# Patient Record
Sex: MALE | Race: WHITE | NOT HISPANIC OR LATINO | Employment: OTHER | URBAN - METROPOLITAN AREA
[De-identification: names, ages, dates, MRNs, and addresses within clinical notes are randomized per-mention and may not be internally consistent; named-entity substitution may affect disease eponyms.]

---

## 2017-02-15 ENCOUNTER — HOSPITAL ENCOUNTER (OUTPATIENT)
Dept: RADIOLOGY | Facility: HOSPITAL | Age: 72
Discharge: HOME | End: 2017-02-15
Attending: INTERNAL MEDICINE
Payer: COMMERCIAL

## 2017-02-15 ENCOUNTER — OFFICE VISIT (OUTPATIENT)
Dept: FAMILY MEDICINE | Facility: CLINIC | Age: 72
End: 2017-02-15
Payer: COMMERCIAL

## 2017-02-15 VITALS
BODY MASS INDEX: 30.34 KG/M2 | TEMPERATURE: 97 F | HEIGHT: 68 IN | HEART RATE: 72 BPM | SYSTOLIC BLOOD PRESSURE: 126 MMHG | OXYGEN SATURATION: 96 % | RESPIRATION RATE: 18 BRPM | WEIGHT: 200.19 LBS | DIASTOLIC BLOOD PRESSURE: 72 MMHG

## 2017-02-15 DIAGNOSIS — E78.00 HYPERCHOLESTEROLEMIA: ICD-10-CM

## 2017-02-15 DIAGNOSIS — Z85.038 PERSONAL HISTORY OF COLON CANCER: ICD-10-CM

## 2017-02-15 DIAGNOSIS — R73.02 IGT (IMPAIRED GLUCOSE TOLERANCE): ICD-10-CM

## 2017-02-15 DIAGNOSIS — J40 BRONCHITIS: ICD-10-CM

## 2017-02-15 DIAGNOSIS — I10 ESSENTIAL HYPERTENSION: ICD-10-CM

## 2017-02-15 DIAGNOSIS — I10 ESSENTIAL HYPERTENSION: Primary | ICD-10-CM

## 2017-02-15 PROCEDURE — 1157F ADVNC CARE PLAN IN RCRD: CPT | Mod: S$GLB,,, | Performed by: INTERNAL MEDICINE

## 2017-02-15 PROCEDURE — 99214 OFFICE O/P EST MOD 30 MIN: CPT | Mod: S$GLB,,, | Performed by: INTERNAL MEDICINE

## 2017-02-15 PROCEDURE — 1160F RVW MEDS BY RX/DR IN RCRD: CPT | Mod: S$GLB,,, | Performed by: INTERNAL MEDICINE

## 2017-02-15 PROCEDURE — 1159F MED LIST DOCD IN RCRD: CPT | Mod: S$GLB,,, | Performed by: INTERNAL MEDICINE

## 2017-02-15 PROCEDURE — 3078F DIAST BP <80 MM HG: CPT | Mod: S$GLB,,, | Performed by: INTERNAL MEDICINE

## 2017-02-15 PROCEDURE — 71020 XR CHEST PA AND LATERAL: CPT | Mod: TC,PO

## 2017-02-15 PROCEDURE — 99999 PR PBB SHADOW E&M-EST. PATIENT-LVL III: CPT | Mod: PBBFAC,,, | Performed by: INTERNAL MEDICINE

## 2017-02-15 PROCEDURE — 3074F SYST BP LT 130 MM HG: CPT | Mod: S$GLB,,, | Performed by: INTERNAL MEDICINE

## 2017-02-15 PROCEDURE — 1126F AMNT PAIN NOTED NONE PRSNT: CPT | Mod: S$GLB,,, | Performed by: INTERNAL MEDICINE

## 2017-02-15 PROCEDURE — 71020 XR CHEST PA AND LATERAL: CPT | Mod: 26,,, | Performed by: RADIOLOGY

## 2017-02-15 RX ORDER — DOXYCYCLINE 100 MG/1
100 CAPSULE ORAL 2 TIMES DAILY
Qty: 20 CAPSULE | Refills: 0 | Status: SHIPPED | OUTPATIENT
Start: 2017-02-15 | End: 2017-04-20

## 2017-02-15 RX ORDER — ALBUTEROL SULFATE 90 UG/1
2 AEROSOL, METERED RESPIRATORY (INHALATION) EVERY 6 HOURS PRN
Qty: 18 G | Refills: 0 | Status: SHIPPED | OUTPATIENT
Start: 2017-02-15 | End: 2017-04-20

## 2017-02-15 RX ORDER — PROMETHAZINE HYDROCHLORIDE AND DEXTROMETHORPHAN HYDROBROMIDE 6.25; 15 MG/5ML; MG/5ML
5 SYRUP ORAL 3 TIMES DAILY PRN
Qty: 118 ML | Refills: 0 | Status: SHIPPED | OUTPATIENT
Start: 2017-02-15 | End: 2017-02-25

## 2017-02-15 NOTE — PROGRESS NOTES
Subjective:       Patient ID: Marcelle Moore is a 71 y.o. male.    Chief Complaint: Follow-up; Cough; Hypertension; Hyperlipidemia; and igt    Cough   The current episode started 1 to 4 weeks ago. Pertinent negatives include no chest pain, chills, ear pain, eye redness, fever, headaches, myalgias, postnasal drip, rash, rhinorrhea, sore throat, shortness of breath or wheezing.   Hypertension   Pertinent negatives include no chest pain, headaches, neck pain, palpitations or shortness of breath. Past treatments include angiotensin blockers. The current treatment provides significant improvement.   Hyperlipidemia   Pertinent negatives include no chest pain, myalgias or shortness of breath. Current antihyperlipidemic treatment includes statins. The current treatment provides significant improvement of lipids.     Review of Systems   Constitutional: Negative for activity change, appetite change, chills, diaphoresis, fatigue, fever and unexpected weight change.   HENT: Negative for drooling, ear discharge, ear pain, facial swelling, hearing loss, mouth sores, nosebleeds, postnasal drip, rhinorrhea, sinus pressure, sneezing, sore throat, tinnitus, trouble swallowing and voice change.    Eyes: Negative for photophobia, redness and visual disturbance.   Respiratory: Positive for cough. Negative for apnea, choking, chest tightness, shortness of breath and wheezing.    Cardiovascular: Negative for chest pain, palpitations and leg swelling.   Gastrointestinal: Negative for abdominal distention, abdominal pain, anal bleeding, blood in stool, constipation, diarrhea, nausea, rectal pain and vomiting.   Endocrine: Negative for cold intolerance, heat intolerance, polydipsia, polyphagia and polyuria.   Genitourinary: Negative for difficulty urinating, dysuria, enuresis, flank pain, frequency, genital sores, hematuria and urgency.   Musculoskeletal: Negative for arthralgias, back pain, gait problem, joint swelling, myalgias, neck  pain and neck stiffness.   Skin: Negative for color change, pallor, rash and wound.   Allergic/Immunologic: Negative for food allergies and immunocompromised state.   Neurological: Negative for dizziness, tremors, seizures, syncope, facial asymmetry, speech difficulty, weakness, light-headedness, numbness and headaches.   Hematological: Negative for adenopathy. Does not bruise/bleed easily.   Psychiatric/Behavioral: Negative for agitation, behavioral problems, confusion, decreased concentration, dysphoric mood, hallucinations, self-injury, sleep disturbance and suicidal ideas. The patient is not nervous/anxious and is not hyperactive.        Objective:      Physical Exam   Constitutional: He is oriented to person, place, and time. He appears well-developed and well-nourished. No distress.   HENT:   Head: Normocephalic and atraumatic.   Mouth/Throat: No oropharyngeal exudate.   Eyes: Pupils are equal, round, and reactive to light. No scleral icterus.   Neck: Normal range of motion. Neck supple. No JVD present. Carotid bruit is not present. No tracheal deviation present. No thyromegaly present.   Cardiovascular: Normal rate, regular rhythm, normal heart sounds and intact distal pulses.    Pulmonary/Chest: Effort normal. No respiratory distress. He has wheezes. He has no rales. He exhibits no tenderness.   Abdominal: Soft. Bowel sounds are normal. He exhibits no distension. There is no tenderness. There is no rebound and no guarding.   Musculoskeletal: Normal range of motion. He exhibits no edema or tenderness.   Lymphadenopathy:     He has no cervical adenopathy.   Neurological: He is alert and oriented to person, place, and time.   Skin: Skin is warm and dry. No rash noted. He is not diaphoretic. No erythema. No pallor.   Psychiatric: He has a normal mood and affect. His behavior is normal. Judgment and thought content normal.   Nursing note and vitals reviewed.      Assessment:       1. Essential hypertension    2.  Hypercholesterolemia    3. IGT (impaired glucose tolerance)    4. Personal history of colon cancer    5. Bronchitis        Plan:         stable-----------continue meds.                     Notes/labs reviewed.                      Check cxr, doxy 100 mg bid x 10 days, promethazine cough med prn, albuterol inh prn--call if persists.                     F/u prn or 6 months.

## 2017-02-15 NOTE — MR AVS SNAPSHOT
[facility]  [address]  [place] [address]  Phone: [phone]                  Marcelle Moore   2/15/2017 1:00 PM   Office Visit    Description:  Male : 1945   Provider:  El Barrios MD   Department:  [facility]           Reason for Visit     Follow-up     Cough     Hypertension     Hyperlipidemia     igt           Diagnoses this Visit        Comments    Essential hypertension    -  Primary     Hypercholesterolemia         IGT (impaired glucose tolerance)         Personal history of colon cancer         Bronchitis                To Do List           Future Appointments        Provider Department Dept Phone    2/15/2017  2:00 PM [facility] XR2 [facility] [phone]    2017 10:00 AM LABORATORY, [facility] [phone]    2017 10:20 AM Aakash Bryson MD [facility] - Hemotology Oncology [phone]    8/15/2017 1:00 PM El Barrios MD [facility] [phone]      Goals (5 Years of Data)     None      Follow-Up and Disposition     Return in about 6 months (around 8/15/2017).       These Medications        Disp Refills Start End    doxycycline (MONODOX) 100 MG capsule 20 capsule 0 2/15/2017     Take 1 capsule (100 mg total) by mouth 2 (two) times daily. - Oral    Pharmacy: [facility] [place], [id number] Ph #: [phone]       promethazine-dextromethorphan (PROMETHAZINE-DM) 6.25-15 mg/5 mL Syrp 118 mL 0 2/15/2017 2017    Take 5 mLs by mouth 3 (three) times daily as needed. - Oral    Pharmacy: [facility]  [place], [place] [id number] Ph #: [phone]       albuterol 90 mcg/actuation inhaler 18 g 0 2/15/2017     Inhale 2 puffs into the lungs every 6 (six) hours as needed for Wheezing. Rescue - Inhalation    Pharmacy: [facility] [place], [place] [id number] Ph #:  [phone]         [facility]     [facility] Care [facility] - 24/7 Assistance  Registered nurses in the [facility] provide clinical advisement, health education, appointment booking, and other advisory services.  Call for this free service at [phone].             Medications           Message regarding Medications     Verify the changes and/or additions to your medication regime listed below are the same as discussed with your clinician today.  If any of these changes or additions are incorrect, please notify your healthcare provider.        START taking these NEW medications        Refills    doxycycline (MONODOX) 100 MG capsule 0    Sig: Take 1 capsule (100 mg total) by mouth 2 (two) times daily.    Class: Normal    Route: Oral    promethazine-dextromethorphan (PROMETHAZINE-DM) 6.25-15 mg/5 mL Syrp 0    Sig: Take 5 mLs by mouth 3 (three) times daily as needed.    Class: Normal    Route: Oral    albuterol 90 mcg/actuation inhaler 0    Sig: Inhale 2 puffs into the lungs every 6 (six) hours as needed for Wheezing. Rescue    Class: Normal    Route: Inhalation           Verify that the below list of medications is an accurate representation of the medications you are currently taking.  If none reported, the list may be blank. If incorrect, please contact your healthcare provider. Carry this list with you in case of emergency.           Current Medications     losartan (COZAAR) 100 MG tablet TAKE ONE TABLET BY MOUTH ONCE DAILY    simvastatin (ZOCOR) 20 MG tablet TAKE ONE TABLET BY MOUTH ONCE DAILY    albuterol 90 mcg/actuation inhaler Inhale 2 puffs into the lungs every 6 (six) hours as needed for Wheezing. Rescue    doxycycline (MONODOX) 100 MG capsule Take 1 capsule (100 mg total) by mouth 2 (two) times daily.    promethazine-dextromethorphan (PROMETHAZINE-DM) 6.25-15 mg/5 mL Syrp Take 5 mLs by mouth 3 (three) times daily as needed.           Clinical Reference Information          "  Your Vitals Were     BP Pulse Temp Resp    126/72 (BP Location: Right arm, Patient Position: Sitting, BP Method: Manual) 72 96.7 °F (35.9 °C) (Tympanic) 18    Height Weight SpO2 BMI    5' 7.5" (1.715 m) 90.8 kg (200 lb 2.8 oz) 96% 30.89 kg/m2      Blood Pressure          Most Recent Value    BP  126/72      Allergies as of 2/15/2017     No Known Allergies      Immunizations Administered on Date of Encounter - 2/15/2017     None      Orders Placed During Today's Visit     Future Labs/Procedures Expected by Expires    X-Ray Chest PA And Lateral  2/15/2017 2/15/2018      Language Assistance Services     ATTENTION: Language assistance services are available, free of charge. Please call [phone].      ATENCIÓN: Si alenla keith, tiene a townsend disposición servicios gratuitos de asistencia lingüística. Llame al [phone].     CHÚ Ý: N?u b?n nói Ti?ng Vi?t, có các d?ch v? h? tr? ngôn ng? mi?n phí dành cho b?n. G?i s? [phone].         [facility] complies with applicable Federal civil rights laws and does not discriminate on the basis of race, color, national origin, age, disability, or sex.        "

## 2017-04-20 ENCOUNTER — LAB VISIT (OUTPATIENT)
Dept: LAB | Facility: HOSPITAL | Age: 72
End: 2017-04-20
Attending: INTERNAL MEDICINE
Payer: COMMERCIAL

## 2017-04-20 ENCOUNTER — OFFICE VISIT (OUTPATIENT)
Dept: HEMATOLOGY/ONCOLOGY | Facility: CLINIC | Age: 72
End: 2017-04-20
Payer: COMMERCIAL

## 2017-04-20 VITALS
HEIGHT: 68 IN | BODY MASS INDEX: 29.54 KG/M2 | WEIGHT: 194.88 LBS | RESPIRATION RATE: 18 BRPM | HEART RATE: 66 BPM | TEMPERATURE: 98 F | OXYGEN SATURATION: 99 % | DIASTOLIC BLOOD PRESSURE: 80 MMHG | SYSTOLIC BLOOD PRESSURE: 120 MMHG

## 2017-04-20 DIAGNOSIS — D50.0 IRON DEFICIENCY ANEMIA DUE TO CHRONIC BLOOD LOSS: ICD-10-CM

## 2017-04-20 DIAGNOSIS — D50.9 MICROCYTIC ANEMIA: Primary | ICD-10-CM

## 2017-04-20 DIAGNOSIS — C18.9 MALIGNANT NEOPLASM OF COLON, UNSPECIFIED PART OF COLON: ICD-10-CM

## 2017-04-20 DIAGNOSIS — Z85.038 PERSONAL HISTORY OF COLON CANCER: ICD-10-CM

## 2017-04-20 LAB
ALBUMIN SERPL BCP-MCNC: 3.9 G/DL
ALP SERPL-CCNC: 68 U/L
ALT SERPL W/O P-5'-P-CCNC: 22 U/L
ANION GAP SERPL CALC-SCNC: 8 MMOL/L
AST SERPL-CCNC: 17 U/L
BASOPHILS # BLD AUTO: 0.04 K/UL
BASOPHILS NFR BLD: 0.4 %
BILIRUB SERPL-MCNC: 0.5 MG/DL
BUN SERPL-MCNC: 15 MG/DL
CALCIUM SERPL-MCNC: 8.8 MG/DL
CEA SERPL-MCNC: 4.3 NG/ML
CHLORIDE SERPL-SCNC: 110 MMOL/L
CO2 SERPL-SCNC: 22 MMOL/L
CREAT SERPL-MCNC: 1 MG/DL
DIFFERENTIAL METHOD: ABNORMAL
EOSINOPHIL # BLD AUTO: 0.4 K/UL
EOSINOPHIL NFR BLD: 3.5 %
ERYTHROCYTE [DISTWIDTH] IN BLOOD BY AUTOMATED COUNT: 18.5 %
EST. GFR  (AFRICAN AMERICAN): >60 ML/MIN/1.73 M^2
EST. GFR  (NON AFRICAN AMERICAN): >60 ML/MIN/1.73 M^2
GLUCOSE SERPL-MCNC: 120 MG/DL
HCT VFR BLD AUTO: 39.9 %
HGB BLD-MCNC: 12.1 G/DL
LYMPHOCYTES # BLD AUTO: 2.1 K/UL
LYMPHOCYTES NFR BLD: 20.8 %
MCH RBC QN AUTO: 23.8 PG
MCHC RBC AUTO-ENTMCNC: 30.3 %
MCV RBC AUTO: 79 FL
MONOCYTES # BLD AUTO: 0.8 K/UL
MONOCYTES NFR BLD: 7.8 %
NEUTROPHILS # BLD AUTO: 6.9 K/UL
NEUTROPHILS NFR BLD: 67.5 %
PLATELET # BLD AUTO: 260 K/UL
PMV BLD AUTO: 9.8 FL
POTASSIUM SERPL-SCNC: 4.2 MMOL/L
PROT SERPL-MCNC: 6.9 G/DL
RBC # BLD AUTO: 5.08 M/UL
SODIUM SERPL-SCNC: 140 MMOL/L
WBC # BLD AUTO: 10.23 K/UL

## 2017-04-20 PROCEDURE — 82378 CARCINOEMBRYONIC ANTIGEN: CPT

## 2017-04-20 PROCEDURE — 1157F ADVNC CARE PLAN IN RCRD: CPT | Mod: 8P,S$GLB,, | Performed by: INTERNAL MEDICINE

## 2017-04-20 PROCEDURE — 80053 COMPREHEN METABOLIC PANEL: CPT | Mod: PO

## 2017-04-20 PROCEDURE — 1160F RVW MEDS BY RX/DR IN RCRD: CPT | Mod: S$GLB,,, | Performed by: INTERNAL MEDICINE

## 2017-04-20 PROCEDURE — 1159F MED LIST DOCD IN RCRD: CPT | Mod: S$GLB,,, | Performed by: INTERNAL MEDICINE

## 2017-04-20 PROCEDURE — 3079F DIAST BP 80-89 MM HG: CPT | Mod: S$GLB,,, | Performed by: INTERNAL MEDICINE

## 2017-04-20 PROCEDURE — 36415 COLL VENOUS BLD VENIPUNCTURE: CPT | Mod: PO

## 2017-04-20 PROCEDURE — 99214 OFFICE O/P EST MOD 30 MIN: CPT | Mod: S$GLB,,, | Performed by: INTERNAL MEDICINE

## 2017-04-20 PROCEDURE — 1126F AMNT PAIN NOTED NONE PRSNT: CPT | Mod: S$GLB,,, | Performed by: INTERNAL MEDICINE

## 2017-04-20 PROCEDURE — 85025 COMPLETE CBC W/AUTO DIFF WBC: CPT | Mod: PO

## 2017-04-20 PROCEDURE — 3074F SYST BP LT 130 MM HG: CPT | Mod: S$GLB,,, | Performed by: INTERNAL MEDICINE

## 2017-04-20 PROCEDURE — 99999 PR PBB SHADOW E&M-EST. PATIENT-LVL III: CPT | Mod: PBBFAC,,, | Performed by: INTERNAL MEDICINE

## 2017-04-20 RX ORDER — IRON,CARBONYL/ASCORBIC ACID 100-250 MG
1 TABLET ORAL DAILY
Qty: 30 TABLET | Refills: 3 | Status: SHIPPED | OUTPATIENT
Start: 2017-04-20 | End: 2017-05-24

## 2017-04-20 NOTE — PROGRESS NOTES
Subjective:       Patient ID: Marcelle Moore is a 71 y.o. male.    Chief Complaint: No chief complaint on file.    HPI This is a 69 year-old  gentleman who because of rectal bleeding had a colonoscopy in 2/2012/. This led to the diagnosis of what initially was felt to be cancer of the sigmoid colon. At the time of surgery, he was found to have two lesions. One below the peritoneal reflection felt to be a rectal cancer, and the other one in the sigmoid area. The patient had 3 positive lymph nodes. The pathology report of 03/15/2012, was the [id number]. Dr. Lacy talked informed me that there was >one that was obviously below the peritoneal reflection qualifying as a rectal cancer. We discussed the fact that recommendations for stage III rectal cancer included chemo/surgery and XRT. He had his Mediport placed He started the the infusional 5Fu portion of the treatment along with the radiation therapy. After 3 weeks, he had to have a break on both the infusional 5fu and XRT due to severe proctitis. He restarted and finished his XRT but did not receive the infusional 5FU. Following the end of the radiation therapy he decided not to have any further chemotherapy He had his Medipot removed. In early Oct 2012 It was noticed He had an increase in his CEA which had gone up from 3.2 to 7.4. He was asked to have a CT/PEt. it was reported as showing an indeterminate uptake in the right upper lobe of the liver. He was asked to return in 2 months. The repeat CT/PEt dec 2012 showed a solitary lesion showing uptake in the right lobe of the liver.He was sent to Dr Hammad Hawthorne of the surgical Oncology department at [facility].Necessary arrangements were done for him to have surgery. He was asked to have a triple phase CT of the liver prior to the surgery The triphase Ct was negative for metastatic disease. We discussed the situation with the Radiology department and Dr Hawthorne. It was decided to do a MRI of the liver. This was  been done and it did not show any obvious evidence of metastatic disease.   He has been followed expectantly since then and CEA and imaging studies have failed to show evidence Last Ct PEt was 2015 He had a colonoscopy in nd was found to have a polyps The gastroenterologist suggested a repeat colonoscopy in a year.  He raised the issued of a probable attenuated FAP .eventually, genetic studies were done and they were negative for familial polyposis,.  He is a rapid polyp former. He comes with hsi wife. He says he is doing well.   He ahd a colonsocopy 2016 and asked to return in a year.    He comes today accompanied by his wife. He says he is doing well.     ALLERGIES: None.  MEDICATIONS: See Med card  PREVIOUS SURGERIES: Left-sided hemicolectomy. Left eye cataract surgery.  Right shoulder surgery.  SOCIAL HISTORY: He is  with four children. He lives in [place].  He smoked briefly from  to , averaging a pack a day, but has not  smoked since. He denies drinking. He used to work as an  for a  lock system for the [employer].  FAMILY HISTORY: Grandfather had colon cancer. Younger sister had breast  cancer. Sister had diabetes. Father also had diabetes. Mother  of a  stroke. She also had a heart attack.  PAST MEDICAL HISTORY:  1. Status post diagnosis of sigmoid and rectal cancers (see above), stage  III.  2. Hypertension.  3. Chronic stable back pain.  4. Hearing loss.  5. Anemia.(iron def.)  6/radiation induced proctitis  8-elevated CEA post-surgery  Review of Systems   Constitutional: Negative.  Negative for fatigue.   Eyes: Negative.    Cardiovascular: Negative.  Negative for chest pain.   Gastrointestinal: Negative for abdominal pain and nausea.   Genitourinary: Negative.  Negative for hematuria.   Musculoskeletal: Negative.    Skin: Negative.    Neurological: Negative.    Psychiatric/Behavioral: Negative.        Objective:      Physical Exam   Constitutional: He  is oriented to person, place, and time. He appears well-developed and well-nourished.   HENT:   Head: Normocephalic.   Mouth/Throat: No oropharyngeal exudate.   Eyes: Pupils are equal, round, and reactive to light.   Neck: No thyromegaly present.   Cardiovascular: Normal rate, regular rhythm and normal heart sounds.  Exam reveals no gallop.    No murmur heard.  Pulmonary/Chest: No respiratory distress. He has no wheezes. He has no rales.   Abdominal: Soft. Bowel sounds are normal. He exhibits no distension and no mass. There is no rebound and no guarding.   Musculoskeletal: Normal range of motion. He exhibits no edema.   Lymphadenopathy:     He has no cervical adenopathy.   Neurological: He is alert and oriented to person, place, and time.   Skin: Skin is warm and dry.   Psychiatric: He has a normal mood and affect. His behavior is normal.       Wt Readings from Last 3 Encounters:   04/20/17 88.4 kg (194 lb 14.2 oz)   02/15/17 90.8 kg (200 lb 2.8 oz)   10/07/16 95.7 kg (210 lb 15.7 oz)     Temp Readings from Last 3 Encounters:   04/20/17 97.7 °F (36.5 °C) (Oral)   02/15/17 96.7 °F (35.9 °C) (Tympanic)   10/07/16 97.7 °F (36.5 °C) (Oral)     BP Readings from Last 3 Encounters:   04/20/17 120/80   02/15/17 126/72   10/07/16 (!) 146/84     Pulse Readings from Last 3 Encounters:   04/20/17 66   02/15/17 72   10/07/16 60       Assessment:       1. Microcytic anemia        Plan:       Lab Results   Component Value Date    WBC 10.23 04/20/2017    HGB 12.1 (L) 04/20/2017    HCT 39.9 (L) 04/20/2017    MCV 79 (L) 04/20/2017     04/20/2017       Is noticed his HGB has dropped from 16 to 12 in one year and the MCV  has gone down from 92 to 70.  Patient and wife were informed this type of prsentation raises the possibility of iron deficiency anemia due to GI losses.  He will be asked to have a ferritin and tibc. Today.  CEA/CMP are pending.  Three hemoccult slides were given to the patient to bring to his earliest  convenience.     Will call patietn and/or wife with the results of the irons tudies and discuss what to do going forward    ADDENDUM    Lab Results   Component Value Date    IRON 27 (L) 04/20/2017    TIBC 481 (H) 04/20/2017    FERRITIN 7 (L) 04/20/2017       Indexes are those of iron deficiency. He needs to meet with Gi to discuss upper/lower endoscopies.  Will call a Rx for ICAR C one tablet a day. See me in 2 months with a cbc, ferriitin and tibc before the visit    ADDENDUM:  4/24/2017  Three hemoccult slides were brought to my office today and were negative for occult blood

## 2017-04-21 ENCOUNTER — TELEPHONE (OUTPATIENT)
Dept: HEMATOLOGY/ONCOLOGY | Facility: CLINIC | Age: 72
End: 2017-04-21

## 2017-04-21 NOTE — TELEPHONE ENCOUNTER
Spoke with Mrs. Moore.  Dr Bryson spoke with Mrs. Moore. Will try to get sooner available appointment with Dr Steward.

## 2017-04-21 NOTE — TELEPHONE ENCOUNTER
----- Message from Aakash Bryson MD sent at 4/20/2017  5:06 PM CDT -----  Please call the wife.  Tell her the iron studies are low, so he does have iron deficiency anemia which is most likley due to bleeding in the GI tract.  Tell her I will call a Rx for ICAR C for him to take one tablet a day. Needs to see me in 2 months with a cbc and a ferritin.  Please also let her know he needs to meet with GI again to discuss doing an upper/lower endoscopies. Please set up an appointment with [facility] GI doctor has seen him before.  DR KIM

## 2017-04-24 ENCOUNTER — TELEPHONE (OUTPATIENT)
Dept: GASTROENTEROLOGY | Facility: CLINIC | Age: 72
End: 2017-04-24

## 2017-04-24 NOTE — TELEPHONE ENCOUNTER
Can you get him in to see me on Wednesday? The schedule is blocked for a meeting, but this meeting was rescheduled for Monday May 1 (already blocked as well). Thanks.

## 2017-04-26 ENCOUNTER — TELEPHONE (OUTPATIENT)
Dept: GASTROENTEROLOGY | Facility: CLINIC | Age: 72
End: 2017-04-26

## 2017-04-28 ENCOUNTER — TELEPHONE (OUTPATIENT)
Dept: HEMATOLOGY/ONCOLOGY | Facility: CLINIC | Age: 72
End: 2017-04-28

## 2017-04-28 ENCOUNTER — TELEPHONE (OUTPATIENT)
Dept: GASTROENTEROLOGY | Facility: CLINIC | Age: 72
End: 2017-04-28

## 2017-04-28 NOTE — TELEPHONE ENCOUNTER
----- Message from Leidy Kaye MA sent at 4/24/2017 10:10 AM CDT -----  Patient has seen Dr Steward in the past patient has a history of colon cancer and rectal bleeding.  His iron deficiency anemia is worse.  He needs a sooner appointment to discuss upper/lower endoscopies.  Thanks Clarisa

## 2017-04-28 NOTE — TELEPHONE ENCOUNTER
----- Message from Julia Gaines sent at 4/28/2017 10:36 AM CDT -----  States requesting test results. Please adv/call [phone].//thanks. cw

## 2017-05-24 ENCOUNTER — OFFICE VISIT (OUTPATIENT)
Dept: GASTROENTEROLOGY | Facility: CLINIC | Age: 72
End: 2017-05-24
Payer: COMMERCIAL

## 2017-05-24 VITALS
SYSTOLIC BLOOD PRESSURE: 138 MMHG | BODY MASS INDEX: 30.27 KG/M2 | HEART RATE: 68 BPM | DIASTOLIC BLOOD PRESSURE: 80 MMHG | WEIGHT: 199.75 LBS | HEIGHT: 68 IN

## 2017-05-24 DIAGNOSIS — Z86.010 HISTORY OF COLON POLYPS: ICD-10-CM

## 2017-05-24 DIAGNOSIS — D50.0 IRON DEFICIENCY ANEMIA DUE TO CHRONIC BLOOD LOSS: Primary | ICD-10-CM

## 2017-05-24 PROCEDURE — 1159F MED LIST DOCD IN RCRD: CPT | Mod: S$GLB,,, | Performed by: INTERNAL MEDICINE

## 2017-05-24 PROCEDURE — 1160F RVW MEDS BY RX/DR IN RCRD: CPT | Mod: S$GLB,,, | Performed by: INTERNAL MEDICINE

## 2017-05-24 PROCEDURE — 3075F SYST BP GE 130 - 139MM HG: CPT | Mod: S$GLB,,, | Performed by: INTERNAL MEDICINE

## 2017-05-24 PROCEDURE — 99214 OFFICE O/P EST MOD 30 MIN: CPT | Mod: S$GLB,,, | Performed by: INTERNAL MEDICINE

## 2017-05-24 PROCEDURE — 3079F DIAST BP 80-89 MM HG: CPT | Mod: S$GLB,,, | Performed by: INTERNAL MEDICINE

## 2017-05-24 PROCEDURE — 1157F ADVNC CARE PLAN IN RCRD: CPT | Mod: 8P,S$GLB,, | Performed by: INTERNAL MEDICINE

## 2017-05-24 PROCEDURE — 99999 PR PBB SHADOW E&M-EST. PATIENT-LVL III: CPT | Mod: PBBFAC,,, | Performed by: INTERNAL MEDICINE

## 2017-05-24 PROCEDURE — 1126F AMNT PAIN NOTED NONE PRSNT: CPT | Mod: S$GLB,,, | Performed by: INTERNAL MEDICINE

## 2017-05-24 RX ORDER — SODIUM, POTASSIUM,MAG SULFATES 17.5-3.13G
1 SOLUTION, RECONSTITUTED, ORAL ORAL ONCE
Qty: 1 BOTTLE | Refills: 0 | Status: SHIPPED | OUTPATIENT
Start: 2017-05-24 | End: 2017-05-24

## 2017-05-24 NOTE — PROGRESS NOTES
Clinic Follow Up:  [facility] Follow Up Note    Reason for Follow Up:  The primary encounter diagnosis was Iron deficiency anemia due to chronic blood loss. A diagnosis of History of colon polyps was also pertinent to this visit.    PCP: El Barrios       HPI:  This is a 71 y.o. male here for follow up of the above issues.  He is due for an upcoming colonoscopy.  He has fairly frequent colonoscopies because of a history of colon cancer and multiple colon polyps.  He was recently noted to be iron deficient.  He denies any blood in the stools or blood in the urine.  He denies any diarrhea, constipation, abdominal pain, weight loss, nausea, vomiting, dysphagia, heartburn, any other significant symptoms.  He denies regular NSAID use.  He uses naproxen about once or twice a week.  He hasn't been taking iron supplements recently.  His CEA has been stable.    Review of Systems:  CONSTITUTIONAL: Denies weight change,  fatigue, fevers, chills, night sweats.  CARDIOVASCULAR: Denies chest pain, shortness of breath, orthopnea and edema.  RESPIRATORY: Denies cough, hemoptysis, dyspnea, and wheezing.  GI: See HPI.  : Denies dysuria and hematuria.    Medical History:  Past Medical History:   Diagnosis Date    Blood transfusion     Cataract     Colon cancer     Colon polyps     Hyperlipidemia     Hypertension        Surgical History:   Past Surgical History:   Procedure Laterality Date    CATARACT EXTRACTION      COLECTOMY      COLONOSCOPY N/A 6/23/2016    Procedure: Colonoscopy;  Surgeon: Heath Steward MD;  [facility];  Service: Endoscopy;  Laterality: N/A;    SHOULDER SURGERY         Family History:   Family History   Problem Relation Age of Onset    Stroke Mother     Hypertension Mother     Diabetes Father     Breast cancer Sister     Colon cancer Paternal Grandfather        Social History:   Social History   Substance Use Topics    Smoking status: Former Smoker     " Quit date: 7/28/1967    Smokeless tobacco: Never Used    Alcohol use No       Allergies: Reviewed    Home Medications:  Medication List with Changes/Refills   New Medications    SODIUM,POTASSIUM,MAG SULFATES (SUPREP BOWEL PREP KIT) 17.5-3.13-1.6 GRAM SOLR    Take 1 kit by mouth once.   Current Medications    LOSARTAN (COZAAR) 100 MG TABLET    TAKE ONE TABLET BY MOUTH ONCE DAILY    SIMVASTATIN (ZOCOR) 20 MG TABLET    TAKE ONE TABLET BY MOUTH ONCE DAILY   Discontinued Medications    IRON-VITAMIN C 100-250 MG, ICAR-C, (ICAR-C) 100-250 MG TAB    Take 1 tablet by mouth once daily.       Physical Exam:  Vital Signs:  /80   Pulse 68   Ht 5' 8" (1.727 m)   Wt 90.6 kg (199 lb 11.8 oz)   BMI 30.37 kg/m²   Body mass index is 30.37 kg/m².      GENERAL: No acute distress, Alert and oriented x 4  EYES: Anicteric, no pallor noted.  ENT: Oropharynx is clear  NECK: Supple, no masses, no thyromegally.  CHEST: Equal breath sounds bilaterally, no wheezing.  CARDIOVASCULAR: Regular rate and rhythm. Murmurs, rubs and gallops absent.  ABDOMEN: soft, non-tender, non-distended, normal bowel sounds.  EXTREMITIES: No clubbing, cyanosis or edema.  SKIN: Without lesion.  LYMPH: No cervical, axillary lymphadenopathy palpable.   NEUROLOGICAL: Grossly normal.    Labs: Pertinent labs reviewed.  Labs are consistent with a mild iron deficiency anemia.    Endoscopy:  No report available    CRC Screening: Due for colonoscopy    Assessment:  1. Iron deficiency anemia due to chronic blood loss    2. History of colon polyps        Recommendations:  1.  Iron deficiency anemia: We will plan for an upper endoscopy and colonoscopy.  If these are unrevealing we will proceed with capsule endoscopy.  No clear source of iron deficiency based on history.    Return to Clinic:    No Follow-up on file.        "

## 2017-06-15 ENCOUNTER — SURGERY (OUTPATIENT)
Age: 72
End: 2017-06-15

## 2017-06-15 ENCOUNTER — HOSPITAL ENCOUNTER (OUTPATIENT)
Facility: HOSPITAL | Age: 72
Discharge: HOME | End: 2017-06-15
Attending: INTERNAL MEDICINE | Admitting: INTERNAL MEDICINE
Payer: COMMERCIAL

## 2017-06-15 ENCOUNTER — ANESTHESIA (OUTPATIENT)
Dept: ENDOSCOPY | Facility: HOSPITAL | Age: 72
End: 2017-06-15
Payer: COMMERCIAL

## 2017-06-15 ENCOUNTER — ANESTHESIA EVENT (OUTPATIENT)
Dept: ENDOSCOPY | Facility: HOSPITAL | Age: 72
End: 2017-06-15
Payer: COMMERCIAL

## 2017-06-15 VITALS
SYSTOLIC BLOOD PRESSURE: 119 MMHG | WEIGHT: 192 LBS | OXYGEN SATURATION: 94 % | RESPIRATION RATE: 18 BRPM | TEMPERATURE: 98 F | DIASTOLIC BLOOD PRESSURE: 71 MMHG | HEIGHT: 68 IN | RESPIRATION RATE: 8 BRPM | BODY MASS INDEX: 29.1 KG/M2 | HEART RATE: 74 BPM

## 2017-06-15 DIAGNOSIS — D50.9 IRON DEFICIENCY ANEMIA: ICD-10-CM

## 2017-06-15 PROCEDURE — 43239 EGD BIOPSY SINGLE/MULTIPLE: CPT | Performed by: INTERNAL MEDICINE

## 2017-06-15 PROCEDURE — 88305 TISSUE EXAM BY PATHOLOGIST: CPT | Performed by: PATHOLOGY

## 2017-06-15 PROCEDURE — 25000003 PHARM REV CODE 250: Performed by: NURSE ANESTHETIST, CERTIFIED REGISTERED

## 2017-06-15 PROCEDURE — 37000008 HC ANESTHESIA 1ST 15 MINUTES: Performed by: INTERNAL MEDICINE

## 2017-06-15 PROCEDURE — 88305 TISSUE EXAM BY PATHOLOGIST: CPT | Mod: 26,,, | Performed by: PATHOLOGY

## 2017-06-15 PROCEDURE — 45380 COLONOSCOPY AND BIOPSY: CPT | Performed by: INTERNAL MEDICINE

## 2017-06-15 PROCEDURE — 63600175 PHARM REV CODE 636 W HCPCS: Performed by: NURSE ANESTHETIST, CERTIFIED REGISTERED

## 2017-06-15 PROCEDURE — 45380 COLONOSCOPY AND BIOPSY: CPT | Mod: 33,,, | Performed by: INTERNAL MEDICINE

## 2017-06-15 PROCEDURE — 27201012 HC FORCEPS, HOT/COLD, DISP: Performed by: INTERNAL MEDICINE

## 2017-06-15 PROCEDURE — 25000003 PHARM REV CODE 250: Performed by: INTERNAL MEDICINE

## 2017-06-15 PROCEDURE — 37000009 HC ANESTHESIA EA ADD 15 MINS: Performed by: INTERNAL MEDICINE

## 2017-06-15 PROCEDURE — 43239 EGD BIOPSY SINGLE/MULTIPLE: CPT | Mod: 51,,, | Performed by: INTERNAL MEDICINE

## 2017-06-15 RX ORDER — PROPOFOL 10 MG/ML
INJECTION, EMULSION INTRAVENOUS
Status: DISCONTINUED | OUTPATIENT
Start: 2017-06-15 | End: 2017-06-15

## 2017-06-15 RX ORDER — LIDOCAINE HCL/PF 100 MG/5ML
SYRINGE (ML) INTRAVENOUS
Status: DISCONTINUED | OUTPATIENT
Start: 2017-06-15 | End: 2017-06-15

## 2017-06-15 RX ORDER — OMEPRAZOLE 40 MG/1
40 CAPSULE, DELAYED RELEASE ORAL DAILY
Qty: 90 CAPSULE | Refills: 3 | Status: SHIPPED | OUTPATIENT
Start: 2017-06-15 | End: 2017-12-27

## 2017-06-15 RX ORDER — SODIUM CHLORIDE, SODIUM LACTATE, POTASSIUM CHLORIDE, CALCIUM CHLORIDE 600; 310; 30; 20 MG/100ML; MG/100ML; MG/100ML; MG/100ML
INJECTION, SOLUTION INTRAVENOUS CONTINUOUS PRN
Status: DISCONTINUED | OUTPATIENT
Start: 2017-06-15 | End: 2017-06-15

## 2017-06-15 RX ORDER — SODIUM CHLORIDE, SODIUM LACTATE, POTASSIUM CHLORIDE, CALCIUM CHLORIDE 600; 310; 30; 20 MG/100ML; MG/100ML; MG/100ML; MG/100ML
INJECTION, SOLUTION INTRAVENOUS CONTINUOUS
Status: DISCONTINUED | OUTPATIENT
Start: 2017-06-15 | End: 2017-06-15 | Stop reason: HOSPADM

## 2017-06-15 RX ADMIN — SODIUM CHLORIDE, SODIUM LACTATE, POTASSIUM CHLORIDE, AND CALCIUM CHLORIDE: .6; .31; .03; .02 INJECTION, SOLUTION INTRAVENOUS at 08:06

## 2017-06-15 RX ADMIN — PROPOFOL 30 MG: 10 INJECTION, EMULSION INTRAVENOUS at 09:06

## 2017-06-15 RX ADMIN — LIDOCAINE HYDROCHLORIDE 50 MG: 20 INJECTION, SOLUTION INTRAVENOUS at 08:06

## 2017-06-15 RX ADMIN — PROPOFOL 140 MG: 10 INJECTION, EMULSION INTRAVENOUS at 08:06

## 2017-06-15 RX ADMIN — SODIUM CHLORIDE, SODIUM LACTATE, POTASSIUM CHLORIDE, AND CALCIUM CHLORIDE: 600; 310; 30; 20 INJECTION, SOLUTION INTRAVENOUS at 08:06

## 2017-06-15 RX ADMIN — PROPOFOL 30 MG: 10 INJECTION, EMULSION INTRAVENOUS at 08:06

## 2017-06-15 NOTE — INTERVAL H&P NOTE
The patient has been examined and the H&P has been reviewed:    I concur with the findings and no changes have occurred since H&P was written.    Anesthesia/Surgery risks, benefits and alternative options discussed and understood by patient/family.          Active Hospital Problems    Diagnosis  POA    Iron deficiency anemia [D50.9]  Yes      Resolved Hospital Problems    Diagnosis Date Resolved POA   No resolved problems to display.

## 2017-06-15 NOTE — DISCHARGE INSTRUCTIONS
Diverticulosis    Diverticulosis means that small pouches have formed in the wall of your large intestine (colon). Most often, this problem causes no symptoms and is common as people age. But the pouches in the colon are at risk of becoming infected. When this happens, the condition is called diverticulitis. Although most people with diverticulosis never develop diverticulitis, it is still not uncommon. Rectal bleeding can also occur and in less common situations, a type of colon inflammation called colitis.  While most people do not have symptoms, some people with diverticulosis may have:  · Abdominal cramps and pain  · Bloating  · Constipation  · Change in bowel habits  Causes  The exact cause of diverticulosis (and diverticulitis) has not been proved, but a few things are associated with the condition:  · Low-fiber diet  · Constipation  · Lack of exercise  Your healthcare provider will talk with you about how to manage your condition. Diet changes may be all that are needed to help control diverticulosis and prevent progression to diverticulitis. If you develop diverticulitis, you will likely need other treatments.  Home care  You may be told to take fiber supplements daily. Fiber adds bulk to the stool so that it passes through the colon more easily. Stool softeners may be recommended. You may also be given medications for pain relief. Be sure to take all medications as directed.  In the past, people were told to avoid corn, nuts, and seeds. This is no longer necessary.  Follow these guidelines when caring for yourself at home:  · Eat unprocessed foods that are high in fiber. Whole grains, fruits, and vegetables are good choices.  · Drink 6 to 8 glasses of water every day unless your healthcare provider has you limit how much fluid you should have.  · Watch for changes in your bowel movements. Tell your provider if you notice any changes.  · Begin an exercise program. Ask your provider how to get started.  Generally, walking is the best.  · Get plenty of rest and sleep.  Follow-up care  Follow up with your healthcare provider, or as advised. Regular visits may be needed to check on your health. Sometimes special procedures such as colonoscopy, are needed after an episode of diverticulitis or blooding. Be sure to keep all your appointments.  If a stool sample was taken, or cultures were done, you should be told if they are positive, or if your treatment needs to be changed. You can call as directed for the results.  If X-rays were done, a radiologist will look at them. You will be told if there is a change in your treatment.  If antibiotics were prescribed, be sure to finish them all.  When to seek medical advice  Call your healthcare provider right away if any of these occur:  · Fever of 100.4°F (38°C) or higher, or as directed by your healthcare provider  · Severe cramps in the lower left side of the abdomen or pain that is getting worse  · Tenderness in the lower left side of the abdomen or worsening pain throughout the abdomen  · Diarrhea or constipation that doesn't get better within 24 hours  · Nausea and vomiting  · Bleeding from the rectum  Call 911  Call emergency services if any of the following occur:  · Trouble breathing  · Confusion  · Very drowsy or trouble awakening  · Fainting or loss of consciousness  · Rapid heart rate  · Chest pain  Date Last Reviewed: 12/30/2015 © 2000-2016 [employer]. [address] [place] [id number]. All rights reserved. This information is not intended as a substitute for professional medical care. Always follow your healthcare professional's instructions.        Understanding Colon and Rectal Polyps    The colon (also called the large intestine) is a muscular tube that forms the last part of the digestive tract. It absorbs water and stores food waste. The colon is about 4 to 6 feet long. The rectum is the last 6 inches of the colon. The colon and rectum  have a smooth lining composed of millions of cells. Changes in these cells can lead to growths in the colon that can become cancerous and should be removed. Multiple tests are available to screen for colon cancer, but the colonoscopy is the most recommended test. During colonoscopy, these polyps can be removed. How often you need this test depends on many things including your condition, your family history, symptoms, and what the findings were at the previous colonoscopy.   When the colon lining changes  Changes that happen in the cells that line the colon or rectum can lead to growths called polyps. Over a period of years, polyps can turn cancerous. Removing polyps early may prevent cancer from ever forming.  Polyps  Polyps are fleshy clumps of tissue that form on the lining of the colon or rectum. Small polyps are usually benign (not cancerous). However, over time, cells in a polyp can change and become cancerous. Certain types of polyps known as adenomatous polyps are premalignant. The risk for invasive cancer increases with the size of the polyp and certain cell and gene features. This means that they can become cancerous if they're not removed. Hyperplastic polyps are benign. They can grow quite large and not turn cancerous.   Cancer  Almost all colorectal cancers start when polyp cells begin growing abnormally. As a cancerous tumor grows, it may involve more and more of the colon or rectum. In time, cancer can also grow beyond the colon or rectum and spread to nearby organs or to glands called lymph nodes. The cells can also travel to other parts of the body. This is known as metastasis. The earlier a cancerous tumor is removed, the better the chance of preventing its spread.    Date Last Reviewed: 8/1/2016  © 5786-9757 [employer]. [address] [place] [address], [place] [address]. All rights reserved. This information is not intended as a substitute for professional medical care. Always follow your  healthcare professional's instructions.        Esophagitis     With esophagitis, the lining of the esophagus is inflamed.   Do you often have burning pain in your chest? You may have esophagitis. This is when the lining of the esophagus becomes red and swollen (inflamed). The esophagus is the tube that connects your throat to your stomach. This sheet tells you more about esophagitis. It also explains your treatment options.  Main types of esophagitis  Reflux esophagitis. This is the more common type. It is caused by GERD (gastroesophageal reflux disease). Stomach contents with stomach acid flow back up into the esophagus. This happens over and over. It leads to inflammation. Risk factors can include:  · Being overweight  · Asthma  · Smoking  · Pregnancy  · Frequent vomiting  · Certain medicines (such as aspirin and other anti-inflammatories)  · Hiatal hernia  Infectious esophagitis. This is caused by an infection. You are more at risk for this if you have a weakened immune system and poor nutrition. Antibiotic use can also be a factor. The infection is often due to the following:  · A type of fungus (typically candida)  · A virus, such as herpes simplex virus 1 (HSV-1) or cytomegalovirus (CMV)  Eosinophilic esophagitis. Foods or other things around you can give you an allergic reaction. This triggers an immune response and leads to esophagitis.  Pill-induced esophagitis. Certain types of medicines can cause inflammation and ulcers in the esophagus. These include doxycycline, aspirin, NSAIDs, alendronate, potassium, quinidine, iron.  Symptoms of esophagitis  The following symptoms can occur with esophagitis:  · Pain when swallowing, or trouble swallowing  · Pain behind your breastbone (heartburn)  · Acid regurgitation  · Chronic sore throat  · Gum Inflammation  · Cavities  · Bad breath  · Nausea  · Pain in your upper belly (abdomen)  · Bleeding (indicated by bright red vomit or black, tarry stool)  These symptoms  occur more often with reflux esophagitis:  · Coughing, wheezing, or asthma  · Hoarseness  Diagnosis of esophagitis  Your healthcare provider will ask about your health history and symptoms. Youll also be examined. Sometimes certain tests are needed. These may include:  · Upper endoscopy. A thin, flexible tube with a tiny light and camera is used. It is inserted through the mouth down into the esophagus. This lets the provider look for damage. A small sample of tissue (biopsy) may also be removed. The sample is sent to a lab for testing.  · Upper GI X-ray with barium. An X-ray is done after you drink a substance called barium. Barium may make problems in the esophagus easier to see on an x-ray.  · Esophageal pH. A soft, thin tube is passed into the esophagus through the nose or mouth for 24 hours. It measures the acid level in the esophagus.  · Esophageal manometry. A soft, thin tube is passed into the esophagus through the nose or mouth. It measures muscle contractions in the esophagus.  Treatment of esophagitis  Medicines. Different medicines can help treat esophagitis. The medicine used will depend on the type of esophagitis you have. Talk with your healthcare provider.  Lifestyle changes. Making the following changes can help reduce irritation and ease your symptoms:  · Avoid spicy foods (pepper, chili powder, mcginnis). Also avoid hard foods (nuts, crackers, raw vegetables) and acidic foods and drinks (tomatoes, citrus fruits and juices). Other problem foods include chocolate, peppermint, nutmeg, and foods high in fat.  · Until you can swallow without pain, follow a combined liquid and soft diet. Try foods such as cooked cereals, mashed potatoes, and soups.  · Take small bites and chew your food thoroughly.  · Avoid large meals and heavy evening meals. Don't lie down within 2 to 3 hours of eating.  · Get to or stay at a healthy weight.  · Avoid alcohol, caffeine, and smoking or tobacco products.  · Brush and floss  your teeth  · Raise your upper body by 4 to 6 inches when lying in bed. This can be done using a foam wedge. Or put blocks under the legs at the head of your bed.  Surgery. This may be needed for severe reflux esophagitis. Other noninvasive procedures to treat GERD and esophagitis are being studied. Your provider can tell you more.  Why treatment Is important  Without treatment, esophagitis can get worse. This is especially true with severe reflux esophagitis. For instance, continued symptoms can cause scarring of the esophagus. Over time, this can cause a narrowing the esophagus (stricture). This can make it hard to pass food down to the stomach. As symptoms go on they can also cause changes in the lining of the esophagus. These changes can put you at a slightly higher risk of cancer of the esophagus.   Date Last Reviewed: 7/1/2016  © 6744-4811 [employer]. [address] [place] [address]. All rights reserved. This information is not intended as a substitute for professional medical care. Always follow your healthcare professional's instructions.

## 2017-06-15 NOTE — ANESTHESIA POSTPROCEDURE EVALUATION
"Anesthesia Post Evaluation    Patient: Marcelle Moore    Procedure(s) Performed: Procedure(s) (LRB):  ESOPHAGOGASTRODUODENOSCOPY (EGD) (N/A)  COLONOSCOPY (N/A)    Final Anesthesia Type: MAC  Patient location during evaluation: PACU  Patient participation: Yes- Able to Participate  Level of consciousness: awake and alert and oriented  Post-procedure vital signs: reviewed and stable  Pain management: adequate  Airway patency: patent  PONV status at discharge: No PONV  Anesthetic complications: no      Cardiovascular status: blood pressure returned to baseline  Respiratory status: unassisted, room air and spontaneous ventilation  Hydration status: euvolemic  Follow-up not needed.        Visit Vitals  BP (!) 102/56 (BP Location: Left arm, Patient Position: Lying, BP Method: Automatic)   Pulse 75   Temp 36.8 °C (98.2 °F) (Oral)   Resp 16   Ht 5' 8" (1.727 m)   Wt 87.1 kg (192 lb)   SpO2 (!) 94%   BMI 29.19 kg/m²       Pain/Jaret Score: Pain Assessment Performed: Yes (6/15/2017  9:22 AM)  Presence of Pain: denies (6/15/2017  9:22 AM)  Jaret Score: 9 (6/15/2017  9:22 AM)      "

## 2017-06-15 NOTE — H&P (VIEW-ONLY)
Clinic Follow Up:  [facility] Follow Up Note    Reason for Follow Up:  The primary encounter diagnosis was Iron deficiency anemia due to chronic blood loss. A diagnosis of History of colon polyps was also pertinent to this visit.    PCP: El Barrios       HPI:  This is a 71 y.o. male here for follow up of the above issues.  He is due for an upcoming colonoscopy.  He has fairly frequent colonoscopies because of a history of colon cancer and multiple colon polyps.  He was recently noted to be iron deficient.  He denies any blood in the stools or blood in the urine.  He denies any diarrhea, constipation, abdominal pain, weight loss, nausea, vomiting, dysphagia, heartburn, any other significant symptoms.  He denies regular NSAID use.  He uses naproxen about once or twice a week.  He hasn't been taking iron supplements recently.  His CEA has been stable.    Review of Systems:  CONSTITUTIONAL: Denies weight change,  fatigue, fevers, chills, night sweats.  CARDIOVASCULAR: Denies chest pain, shortness of breath, orthopnea and edema.  RESPIRATORY: Denies cough, hemoptysis, dyspnea, and wheezing.  GI: See HPI.  : Denies dysuria and hematuria.    Medical History:  Past Medical History:   Diagnosis Date    Blood transfusion     Cataract     Colon cancer     Colon polyps     Hyperlipidemia     Hypertension        Surgical History:   Past Surgical History:   Procedure Laterality Date    CATARACT EXTRACTION      COLECTOMY      COLONOSCOPY N/A 6/23/2016    Procedure: Colonoscopy;  Surgeon: Heath Steward MD;  Location: [facility];  Service: Endoscopy;  Laterality: N/A;    SHOULDER SURGERY         Family History:   Family History   Problem Relation Age of Onset    Stroke Mother     Hypertension Mother     Diabetes Father     Breast cancer Sister     Colon cancer Paternal Grandfather        Social History:   Social History   Substance Use Topics    Smoking status: Former Smoker     " Quit date: 7/28/1967    Smokeless tobacco: Never Used    Alcohol use No       Allergies: Reviewed    Home Medications:  Medication List with Changes/Refills   New Medications    SODIUM,POTASSIUM,MAG SULFATES (SUPREP BOWEL PREP KIT) 17.5-3.13-1.6 GRAM SOLR    Take 1 kit by mouth once.   Current Medications    LOSARTAN (COZAAR) 100 MG TABLET    TAKE ONE TABLET BY MOUTH ONCE DAILY    SIMVASTATIN (ZOCOR) 20 MG TABLET    TAKE ONE TABLET BY MOUTH ONCE DAILY   Discontinued Medications    IRON-VITAMIN C 100-250 MG, ICAR-C, (ICAR-C) 100-250 MG TAB    Take 1 tablet by mouth once daily.       Physical Exam:  Vital Signs:  /80   Pulse 68   Ht 5' 8" (1.727 m)   Wt 90.6 kg (199 lb 11.8 oz)   BMI 30.37 kg/m²   Body mass index is 30.37 kg/m².      GENERAL: No acute distress, Alert and oriented x 4  EYES: Anicteric, no pallor noted.  ENT: Oropharynx is clear  NECK: Supple, no masses, no thyromegally.  CHEST: Equal breath sounds bilaterally, no wheezing.  CARDIOVASCULAR: Regular rate and rhythm. Murmurs, rubs and gallops absent.  ABDOMEN: soft, non-tender, non-distended, normal bowel sounds.  EXTREMITIES: No clubbing, cyanosis or edema.  SKIN: Without lesion.  LYMPH: No cervical, axillary lymphadenopathy palpable.   NEUROLOGICAL: Grossly normal.    Labs: Pertinent labs reviewed.  Labs are consistent with a mild iron deficiency anemia.    Endoscopy:  No report available    CRC Screening: Due for colonoscopy    Assessment:  1. Iron deficiency anemia due to chronic blood loss    2. History of colon polyps        Recommendations:  1.  Iron deficiency anemia: We will plan for an upper endoscopy and colonoscopy.  If these are unrevealing we will proceed with capsule endoscopy.  No clear source of iron deficiency based on history.    Return to Clinic:    No Follow-up on file.        "

## 2017-06-15 NOTE — PLAN OF CARE
Dr Sandoval came to bedside and discussed findings. NO N/V,  no abdominal pain, no GI bleeding, and vitals stable.  Pt discharged from unit.

## 2017-06-15 NOTE — ANESTHESIA PREPROCEDURE EVALUATION
06/15/2017  Marcelle Moore is a 71 y.o., male.    Pre-op Assessment    I have reviewed the Patient Summary Reports.     I have reviewed the Nursing Notes.   I have reviewed the Medications.     Review of Systems  Anesthesia Hx:  No problems with previous Anesthesia Last colonoscopy wake up was longer than usual Denies Family Hx of Anesthesia complications.   Denies Personal Hx of Anesthesia complications.   Social:  Non-Smoker, No Alcohol Use    Hematology/Oncology:         -- Anemia: --  Cancer in past history:  chemotherapy  Oncology Comments: Colon resection 2009 with chemo and radiation.     Cardiovascular:   Exercise tolerance: good Hypertension, well controlled hyperlipidemia    Pulmonary:   Snore   Renal/:  Renal/ Normal     Hepatic/GI:  Hepatic/GI Normal Bowel Prep. Last bowel prep 0500   Musculoskeletal:  Musculoskeletal Normal    Neurological:  Neurology Normal    Endocrine:  Endocrine Normal    Dermatological:  Skin Normal    Psych:  Psychiatric Normal           Physical Exam  General:  Well nourished, Obesity    Airway/Jaw/Neck:  Airway Findings: Mallampati: III     Dental:  Dental Findings: Upper Dentures, Lower Dentures             Anesthesia Plan  Type of Anesthesia, risks & benefits discussed:  Anesthesia Type:  MAC  Patient's Preference:   Intra-op Monitoring Plan:   Intra-op Monitoring Plan Comments:   Post Op Pain Control Plan:   Post Op Pain Control Plan Comments:   Induction:   IV  Beta Blocker:  Patient is on a Beta-Blocker and has received one dose within the past 24 hours (No further documentation required).       Informed Consent: Patient understands risks and agrees with Anesthesia plan.  Questions answered.   ASA Score: 2     Day of Surgery Review of History & Physical: I have interviewed and examined the patient. I have reviewed the patient's H&P dated: 06/23/16. There are  no significant changes.  H&P update referred to the provider.         Ready For Surgery From Anesthesia Perspective.

## 2017-06-15 NOTE — TRANSFER OF CARE
"Anesthesia Transfer of Care Note    Patient: Marcelle Moore    Procedure(s) Performed: Procedure(s) (LRB):  ESOPHAGOGASTRODUODENOSCOPY (EGD) (N/A)  COLONOSCOPY (N/A)    Patient location: PACU    Anesthesia Type: MAC    Transport from OR: Transported from OR on room air with adequate spontaneous ventilation    Post pain: adequate analgesia    Post assessment: no apparent anesthetic complications    Post vital signs: stable    Level of consciousness: sedated    Nausea/Vomiting: no nausea/vomiting    Complications: none    Transfer of care protocol was followed      Last vitals:   Visit Vitals  BP (!) 102/56 (BP Location: Left arm, Patient Position: Lying, BP Method: Automatic)   Pulse 75   Temp 36.8 °C (98.2 °F) (Oral)   Resp 16   Ht 5' 8" (1.727 m)   Wt 87.1 kg (192 lb)   SpO2 (!) 94%   BMI 29.19 kg/m²     "

## 2017-06-15 NOTE — ANESTHESIA RELEASE NOTE
"Anesthesia Release from PACU Note    Patient: Marcelle Moore    Procedure(s) Performed: Procedure(s) (LRB):  ESOPHAGOGASTRODUODENOSCOPY (EGD) (N/A)  COLONOSCOPY (N/A)    Anesthesia type: MAC    Post pain: Adequate analgesia    Post assessment: no apparent anesthetic complications, tolerated procedure well and no evidence of recall    Last Vitals:   Visit Vitals  BP (!) 102/56 (BP Location: Left arm, Patient Position: Lying, BP Method: Automatic)   Pulse 75   Temp 36.8 °C (98.2 °F) (Oral)   Resp 16   Ht 5' 8" (1.727 m)   Wt 87.1 kg (192 lb)   SpO2 (!) 94%   BMI 29.19 kg/m²       Post vital signs: stable    Level of consciousness: awake and alert     Nausea/Vomiting: no nausea/no vomiting    Complications: none    Airway Patency: patent    Respiratory: unassisted, spontaneous ventilation, room air    Cardiovascular: stable    Hydration: euvolemic  "

## 2017-10-27 RX ORDER — SIMVASTATIN 20 MG/1
20 TABLET, FILM COATED ORAL DAILY
Qty: 30 TABLET | Refills: 12 | Status: SHIPPED | OUTPATIENT
Start: 2017-10-27 | End: 2018-11-08 | Stop reason: SDUPTHER

## 2017-10-27 RX ORDER — LOSARTAN POTASSIUM 100 MG/1
100 TABLET ORAL DAILY
Qty: 30 TABLET | Refills: 12 | Status: SHIPPED | OUTPATIENT
Start: 2017-10-27 | End: 2018-11-08 | Stop reason: SDUPTHER

## 2017-10-27 NOTE — TELEPHONE ENCOUNTER
----- Message from Shawnee Hernandez sent at 10/27/2017  2:51 PM CDT -----  Contact: pt   Call pt regarding getting his med transfer to another pharmacy and need a new script. Pt states that he only has 2 days left. Pt want his blood pressure and Cholesterol med transfer from [facility] to [facility].     ...[phone]

## 2017-10-28 ENCOUNTER — NURSE TRIAGE (OUTPATIENT)
Dept: ADMINISTRATIVE | Facility: CLINIC | Age: 72
End: 2017-10-28

## 2017-10-28 NOTE — TELEPHONE ENCOUNTER
Admit 6/2017  Spouse called re changed rx from plaqu[facility] to [facility]. Pt on zocor and cozaar. Pt afraid he is going to run out. [facility] [phone]. Spoke with Mary prescription rec'd today. Called in by another triager. Pt notified. Call back with questions.     Reason for Disposition   Caller has medication question about med not prescribed by PCP and triager unable to answer question (e.g., compatibility with other med, storage)    Protocols used: ST MEDICATION QUESTION CALL-A-

## 2017-10-28 NOTE — TELEPHONE ENCOUNTER
"All medications transferred to different pharmacy per patient request information provided/accepted per Mary PharmD    Answer Assessment - Initial Assessment Questions  1. SYMPTOMS: "Do you have any symptoms?"      No   2. SEVERITY: If symptoms are present, ask "Are they mild, moderate or severe?"      n/a    Protocols used: ST MEDICATION QUESTION CALL-A-AH      "

## 2017-12-27 ENCOUNTER — HOSPITAL ENCOUNTER (OUTPATIENT)
Dept: RADIOLOGY | Facility: HOSPITAL | Age: 72
Discharge: HOME | End: 2017-12-27
Attending: INTERNAL MEDICINE
Payer: COMMERCIAL

## 2017-12-27 ENCOUNTER — OFFICE VISIT (OUTPATIENT)
Dept: FAMILY MEDICINE | Facility: CLINIC | Age: 72
End: 2017-12-27
Payer: COMMERCIAL

## 2017-12-27 VITALS
RESPIRATION RATE: 16 BRPM | DIASTOLIC BLOOD PRESSURE: 80 MMHG | HEART RATE: 70 BPM | BODY MASS INDEX: 30.01 KG/M2 | HEIGHT: 68 IN | OXYGEN SATURATION: 98 % | TEMPERATURE: 96 F | WEIGHT: 198 LBS | SYSTOLIC BLOOD PRESSURE: 138 MMHG

## 2017-12-27 DIAGNOSIS — I10 ESSENTIAL HYPERTENSION: ICD-10-CM

## 2017-12-27 DIAGNOSIS — Z85.038 HISTORY OF COLON CANCER: ICD-10-CM

## 2017-12-27 DIAGNOSIS — D50.9 IRON DEFICIENCY ANEMIA, UNSPECIFIED IRON DEFICIENCY ANEMIA TYPE: ICD-10-CM

## 2017-12-27 DIAGNOSIS — M25.512 ACUTE PAIN OF LEFT SHOULDER: ICD-10-CM

## 2017-12-27 DIAGNOSIS — R73.02 IGT (IMPAIRED GLUCOSE TOLERANCE): ICD-10-CM

## 2017-12-27 DIAGNOSIS — J32.9 SINUSITIS, UNSPECIFIED CHRONICITY, UNSPECIFIED LOCATION: ICD-10-CM

## 2017-12-27 DIAGNOSIS — E78.00 HYPERCHOLESTEROLEMIA: ICD-10-CM

## 2017-12-27 DIAGNOSIS — I10 ESSENTIAL HYPERTENSION: Primary | ICD-10-CM

## 2017-12-27 PROCEDURE — 73030 X-RAY EXAM OF SHOULDER: CPT | Mod: 26,LT,, | Performed by: RADIOLOGY

## 2017-12-27 PROCEDURE — 99999 PR PBB SHADOW E&M-EST. PATIENT-LVL IV: CPT | Mod: PBBFAC,,, | Performed by: INTERNAL MEDICINE

## 2017-12-27 PROCEDURE — 71020 XR CHEST PA AND LATERAL: CPT | Mod: TC,PO

## 2017-12-27 PROCEDURE — 71020 XR CHEST PA AND LATERAL: CPT | Mod: 26,,, | Performed by: RADIOLOGY

## 2017-12-27 PROCEDURE — 73030 X-RAY EXAM OF SHOULDER: CPT | Mod: TC,PO,LT

## 2017-12-27 PROCEDURE — 99215 OFFICE O/P EST HI 40 MIN: CPT | Mod: S$GLB,,, | Performed by: INTERNAL MEDICINE

## 2017-12-27 RX ORDER — PROMETHAZINE HYDROCHLORIDE AND DEXTROMETHORPHAN HYDROBROMIDE 6.25; 15 MG/5ML; MG/5ML
5 SYRUP ORAL 3 TIMES DAILY PRN
Qty: 180 ML | Refills: 0 | Status: SHIPPED | OUTPATIENT
Start: 2017-12-27 | End: 2018-01-06

## 2017-12-27 RX ORDER — MELOXICAM 15 MG/1
15 TABLET ORAL DAILY
Qty: 30 TABLET | Refills: 0 | Status: SHIPPED | OUTPATIENT
Start: 2017-12-27 | End: 2018-01-23 | Stop reason: SDUPTHER

## 2017-12-27 RX ORDER — AZITHROMYCIN 250 MG/1
TABLET, FILM COATED ORAL
Qty: 6 TABLET | Refills: 0 | Status: SHIPPED | OUTPATIENT
Start: 2017-12-27 | End: 2019-11-18

## 2017-12-27 RX ORDER — BACLOFEN 10 MG/1
10 TABLET ORAL NIGHTLY PRN
Qty: 30 TABLET | Refills: 0 | Status: SHIPPED | OUTPATIENT
Start: 2017-12-27 | End: 2018-01-23 | Stop reason: SDUPTHER

## 2017-12-27 NOTE — PROGRESS NOTES
Subjective:       Patient ID: Marcelle Moore is a 72 y.o. male.    Chief Complaint: Shoulder Pain (left); Cough (x3 weeks); Hypertension; Hyperlipidemia; Anemia; and Insulin Resistance    Shoulder Pain    This is a new problem. The current episode started 1 to 4 weeks ago. The problem occurs constantly. Pertinent negatives include no fever, headaches or numbness.   Cough   This is a new problem. The current episode started 1 to 4 weeks ago. Pertinent negatives include no chest pain, chills, ear pain, eye redness, fever, headaches, myalgias, postnasal drip, rash, rhinorrhea, sore throat, shortness of breath or wheezing.   Hypertension   This is a chronic problem. The problem is controlled. Pertinent negatives include no chest pain, headaches, neck pain, palpitations or shortness of breath.   Hyperlipidemia   This is a chronic problem. Pertinent negatives include no chest pain, myalgias or shortness of breath. Current antihyperlipidemic treatment includes statins. The current treatment provides significant improvement of lipids.   Anemia   Presents for follow-up visit. There has been no abdominal pain, bruising/bleeding easily, confusion, fever, light-headedness, pallor or palpitations.     Review of Systems   Constitutional: Negative for activity change, appetite change, chills, diaphoresis, fatigue, fever and unexpected weight change.   HENT: Negative for drooling, ear discharge, ear pain, facial swelling, hearing loss, mouth sores, nosebleeds, postnasal drip, rhinorrhea, sinus pressure, sneezing, sore throat, tinnitus, trouble swallowing and voice change.    Eyes: Negative for photophobia, redness and visual disturbance.   Respiratory: Positive for cough. Negative for apnea, choking, chest tightness, shortness of breath and wheezing.    Cardiovascular: Negative for chest pain, palpitations and leg swelling.   Gastrointestinal: Negative for abdominal distention, abdominal pain, anal bleeding, blood in stool,  constipation, diarrhea, nausea and vomiting.   Endocrine: Negative for cold intolerance, heat intolerance, polydipsia, polyphagia and polyuria.   Genitourinary: Negative for difficulty urinating, dysuria, enuresis, flank pain, frequency, genital sores, hematuria and urgency.   Musculoskeletal: Negative for arthralgias, back pain, gait problem, joint swelling, myalgias, neck pain and neck stiffness.   Skin: Negative for color change, pallor, rash and wound.   Allergic/Immunologic: Negative for food allergies and immunocompromised state.   Neurological: Negative for dizziness, tremors, seizures, syncope, facial asymmetry, speech difficulty, weakness, light-headedness, numbness and headaches.   Hematological: Negative for adenopathy. Does not bruise/bleed easily.   Psychiatric/Behavioral: Negative for agitation, behavioral problems, confusion, decreased concentration, dysphoric mood, hallucinations, self-injury, sleep disturbance and suicidal ideas. The patient is not nervous/anxious and is not hyperactive.        Objective:      Physical Exam   Constitutional: He is oriented to person, place, and time. He appears well-developed and well-nourished. No distress.   HENT:   Head: Normocephalic and atraumatic.   Eyes: Pupils are equal, round, and reactive to light.   Neck: Normal range of motion. Neck supple. No JVD present. Carotid bruit is not present. No tracheal deviation present. No thyromegaly present.   Cardiovascular: Normal rate, regular rhythm, normal heart sounds and intact distal pulses.    Pulmonary/Chest: Effort normal and breath sounds normal. No respiratory distress. He has no wheezes. He has no rales. He exhibits no tenderness.   Abdominal: Soft. Bowel sounds are normal. He exhibits no distension. There is no tenderness. There is no rebound and no guarding.   Musculoskeletal: Normal range of motion. He exhibits no edema or tenderness.   Lymphadenopathy:     He has no cervical adenopathy.   Neurological: He  is alert and oriented to person, place, and time.   Skin: Skin is warm and dry. No rash noted. He is not diaphoretic. No erythema. No pallor.   Psychiatric: He has a normal mood and affect. His behavior is normal. Judgment and thought content normal.   Nursing note and vitals reviewed.      Assessment:       1. Essential hypertension    2. Hypercholesterolemia    3. History of colon cancer    4. IGT (impaired glucose tolerance)    5. Iron deficiency anemia, unspecified iron deficiency anemia type    6. Sinusitis, unspecified chronicity, unspecified location    7. Acute pain of left shoulder        Plan:         continue meds.                  Notes/labs reviewed.                        Check cmp,cbc,lipids,hga1c,psa,cxr,xray left shoulder.                  mobic 15 mg q day w baclofen 10 mg q hs for shoulder-------follow.                     z-pack, promethazine cough med prn---------call if persists.           F/u 1 month.

## 2018-01-10 ENCOUNTER — PATIENT OUTREACH (OUTPATIENT)
Dept: ADMINISTRATIVE | Facility: HOSPITAL | Age: 73
End: 2018-01-10

## 2018-01-23 RX ORDER — BACLOFEN 10 MG/1
TABLET ORAL
Qty: 30 TABLET | Refills: 0 | Status: SHIPPED | OUTPATIENT
Start: 2018-01-23 | End: 2019-11-18

## 2018-01-23 RX ORDER — MELOXICAM 15 MG/1
TABLET ORAL
Qty: 30 TABLET | Refills: 0 | Status: SHIPPED | OUTPATIENT
Start: 2018-01-23 | End: 2019-11-18

## 2018-11-08 RX ORDER — LOSARTAN POTASSIUM 100 MG/1
TABLET ORAL
Qty: 30 TABLET | Refills: 7 | Status: SHIPPED | OUTPATIENT
Start: 2018-11-08 | End: 2019-06-01 | Stop reason: SDUPTHER

## 2018-11-08 RX ORDER — SIMVASTATIN 20 MG/1
TABLET, FILM COATED ORAL
Qty: 30 TABLET | Refills: 7 | Status: SHIPPED | OUTPATIENT
Start: 2018-11-08 | End: 2019-06-01 | Stop reason: SDUPTHER

## 2019-02-18 ENCOUNTER — PATIENT OUTREACH (OUTPATIENT)
Dept: ADMINISTRATIVE | Facility: HOSPITAL | Age: 74
End: 2019-02-18

## 2019-02-18 NOTE — PROGRESS NOTES
Contacted patient to schedule annual pcp appointment. Patient was unavailable, unable to leave a message.

## 2019-05-16 ENCOUNTER — TELEPHONE (OUTPATIENT)
Dept: FAMILY MEDICINE | Facility: CLINIC | Age: 74
End: 2019-05-16

## 2019-05-16 NOTE — TELEPHONE ENCOUNTER
----- Message from Jazmin Patel sent at 5/16/2019 10:00 AM CDT -----  Contact: pt wife Ms Zamorano   Type:  Patient Returning Call    Who Called: pt wife Ms Zamorano   Who Left Message for Patient: Dr Barrios office  Does the patient know what this is regarding?: not sure refill   Would the patient rather a call back or a response via [facility]? Call back   Best Call Back Number: [phone] or [phone]   Additional Information: also stated bp was high        [id number] - [place] - [address]  [address]  [place] [address]  Phone: [phone] Fax: [phone]

## 2019-05-17 NOTE — TELEPHONE ENCOUNTER
Pt need to know if he should continue losartan and simvistatin. Received letter saying they were recalled   Please advise

## 2019-06-02 RX ORDER — LOSARTAN POTASSIUM 100 MG/1
TABLET ORAL
Qty: 30 TABLET | Refills: 0 | Status: SHIPPED | OUTPATIENT
Start: 2019-06-02 | End: 2019-07-30 | Stop reason: SDUPTHER

## 2019-06-02 RX ORDER — SIMVASTATIN 20 MG/1
TABLET, FILM COATED ORAL
Qty: 30 TABLET | Refills: 0 | Status: SHIPPED | OUTPATIENT
Start: 2019-06-02 | End: 2019-07-30 | Stop reason: SDUPTHER

## 2019-07-30 RX ORDER — SIMVASTATIN 20 MG/1
TABLET, FILM COATED ORAL
Qty: 30 TABLET | Refills: 0 | Status: SHIPPED | OUTPATIENT
Start: 2019-07-30 | End: 2019-08-12 | Stop reason: SDUPTHER

## 2019-07-30 RX ORDER — LOSARTAN POTASSIUM 100 MG/1
TABLET ORAL
Qty: 30 TABLET | Refills: 0 | Status: SHIPPED | OUTPATIENT
Start: 2019-07-30 | End: 2019-08-12 | Stop reason: SDUPTHER

## 2019-08-12 RX ORDER — LOSARTAN POTASSIUM 100 MG/1
100 TABLET ORAL DAILY
Qty: 30 TABLET | Refills: 0 | Status: SHIPPED | OUTPATIENT
Start: 2019-08-12 | End: 2019-11-18 | Stop reason: SDUPTHER

## 2019-08-12 RX ORDER — SIMVASTATIN 20 MG/1
20 TABLET, FILM COATED ORAL DAILY
Qty: 30 TABLET | Refills: 0 | Status: SHIPPED | OUTPATIENT
Start: 2019-08-12 | End: 2019-11-18 | Stop reason: SDUPTHER

## 2019-10-21 RX ORDER — LOSARTAN POTASSIUM 100 MG/1
TABLET ORAL
Qty: 30 TABLET | Refills: 0 | OUTPATIENT
Start: 2019-10-21

## 2019-10-21 RX ORDER — SIMVASTATIN 20 MG/1
TABLET, FILM COATED ORAL
Qty: 30 TABLET | Refills: 0 | OUTPATIENT
Start: 2019-10-21

## 2019-10-21 NOTE — TELEPHONE ENCOUNTER
Attempted to call pt.  Pt was not at home.  Left message with wife for pt to return call to schedule appt.

## 2019-11-18 ENCOUNTER — TELEPHONE (OUTPATIENT)
Dept: FAMILY MEDICINE | Facility: CLINIC | Age: 74
End: 2019-11-18

## 2019-11-18 ENCOUNTER — CLINICAL SUPPORT (OUTPATIENT)
Dept: FAMILY MEDICINE | Facility: CLINIC | Age: 74
End: 2019-11-18
Payer: COMMERCIAL

## 2019-11-18 ENCOUNTER — LAB VISIT (OUTPATIENT)
Dept: LAB | Facility: HOSPITAL | Age: 74
End: 2019-11-18
Attending: INTERNAL MEDICINE
Payer: COMMERCIAL

## 2019-11-18 ENCOUNTER — OFFICE VISIT (OUTPATIENT)
Dept: FAMILY MEDICINE | Facility: CLINIC | Age: 74
End: 2019-11-18
Payer: COMMERCIAL

## 2019-11-18 VITALS — SYSTOLIC BLOOD PRESSURE: 193 MMHG | DIASTOLIC BLOOD PRESSURE: 101 MMHG

## 2019-11-18 VITALS
BODY MASS INDEX: 30.49 KG/M2 | OXYGEN SATURATION: 95 % | DIASTOLIC BLOOD PRESSURE: 101 MMHG | SYSTOLIC BLOOD PRESSURE: 193 MMHG | HEART RATE: 57 BPM | WEIGHT: 200.5 LBS | TEMPERATURE: 98 F

## 2019-11-18 DIAGNOSIS — I10 ESSENTIAL HYPERTENSION: ICD-10-CM

## 2019-11-18 DIAGNOSIS — Z85.038 PERSONAL HISTORY OF COLON CANCER: ICD-10-CM

## 2019-11-18 DIAGNOSIS — Z85.038 HISTORY OF COLON CANCER: ICD-10-CM

## 2019-11-18 DIAGNOSIS — Z12.5 ENCOUNTER FOR PROSTATE CANCER SCREENING: ICD-10-CM

## 2019-11-18 DIAGNOSIS — I10 ESSENTIAL HYPERTENSION: Primary | ICD-10-CM

## 2019-11-18 DIAGNOSIS — R73.02 IGT (IMPAIRED GLUCOSE TOLERANCE): ICD-10-CM

## 2019-11-18 DIAGNOSIS — E78.00 HYPERCHOLESTEROLEMIA: ICD-10-CM

## 2019-11-18 DIAGNOSIS — Z91.199 NONCOMPLIANCE: ICD-10-CM

## 2019-11-18 LAB
ALBUMIN SERPL BCP-MCNC: 4.2 G/DL (ref 3.5–5.2)
ALP SERPL-CCNC: 68 U/L (ref 55–135)
ALT SERPL W/O P-5'-P-CCNC: 23 U/L (ref 10–44)
ANION GAP SERPL CALC-SCNC: 8 MMOL/L (ref 8–16)
AST SERPL-CCNC: 18 U/L (ref 10–40)
BASOPHILS # BLD AUTO: 0.07 K/UL (ref 0–0.2)
BASOPHILS NFR BLD: 0.9 % (ref 0–1.9)
BILIRUB SERPL-MCNC: 1 MG/DL (ref 0.1–1)
BUN SERPL-MCNC: 13 MG/DL (ref 8–23)
CALCIUM SERPL-MCNC: 9.5 MG/DL (ref 8.7–10.5)
CEA SERPL-MCNC: 5.4 NG/ML (ref 0–5)
CHLORIDE SERPL-SCNC: 106 MMOL/L (ref 95–110)
CHOLEST SERPL-MCNC: 123 MG/DL (ref 120–199)
CHOLEST/HDLC SERPL: 3.2 {RATIO} (ref 2–5)
CO2 SERPL-SCNC: 27 MMOL/L (ref 23–29)
COMPLEXED PSA SERPL-MCNC: 0.35 NG/ML (ref 0–4)
CREAT SERPL-MCNC: 0.9 MG/DL (ref 0.5–1.4)
DIFFERENTIAL METHOD: ABNORMAL
EOSINOPHIL # BLD AUTO: 0.3 K/UL (ref 0–0.5)
EOSINOPHIL NFR BLD: 4.2 % (ref 0–8)
ERYTHROCYTE [DISTWIDTH] IN BLOOD BY AUTOMATED COUNT: 14.6 % (ref 11.5–14.5)
EST. GFR  (AFRICAN AMERICAN): >60 ML/MIN/1.73 M^2
EST. GFR  (NON AFRICAN AMERICAN): >60 ML/MIN/1.73 M^2
GLUCOSE SERPL-MCNC: 128 MG/DL (ref 70–110)
HCT VFR BLD AUTO: 49.2 % (ref 40–54)
HDLC SERPL-MCNC: 38 MG/DL (ref 40–75)
HDLC SERPL: 30.9 % (ref 20–50)
HGB BLD-MCNC: 15.8 G/DL (ref 14–18)
IMM GRANULOCYTES # BLD AUTO: 0.04 K/UL (ref 0–0.04)
IMM GRANULOCYTES NFR BLD AUTO: 0.5 % (ref 0–0.5)
LDLC SERPL CALC-MCNC: 56.2 MG/DL (ref 63–159)
LYMPHOCYTES # BLD AUTO: 2.4 K/UL (ref 1–4.8)
LYMPHOCYTES NFR BLD: 29.2 % (ref 18–48)
MCH RBC QN AUTO: 30.9 PG (ref 27–31)
MCHC RBC AUTO-ENTMCNC: 32.1 G/DL (ref 32–36)
MCV RBC AUTO: 96 FL (ref 82–98)
MONOCYTES # BLD AUTO: 0.7 K/UL (ref 0.3–1)
MONOCYTES NFR BLD: 8.8 % (ref 4–15)
NEUTROPHILS # BLD AUTO: 4.5 K/UL (ref 1.8–7.7)
NEUTROPHILS NFR BLD: 56.4 % (ref 38–73)
NONHDLC SERPL-MCNC: 85 MG/DL
NRBC BLD-RTO: 0 /100 WBC
PLATELET # BLD AUTO: 196 K/UL (ref 150–350)
PMV BLD AUTO: 9.9 FL (ref 9.2–12.9)
POTASSIUM SERPL-SCNC: 4.5 MMOL/L (ref 3.5–5.1)
PROT SERPL-MCNC: 7.2 G/DL (ref 6–8.4)
RBC # BLD AUTO: 5.11 M/UL (ref 4.6–6.2)
SODIUM SERPL-SCNC: 141 MMOL/L (ref 136–145)
TRIGL SERPL-MCNC: 144 MG/DL (ref 30–150)
TSH SERPL DL<=0.005 MIU/L-ACNC: 2.27 UIU/ML (ref 0.4–4)
WBC # BLD AUTO: 8.04 K/UL (ref 3.9–12.7)

## 2019-11-18 PROCEDURE — 99999 PR PBB SHADOW E&M-EST. PATIENT-LVL III: CPT | Mod: PBBFAC,,, | Performed by: INTERNAL MEDICINE

## 2019-11-18 PROCEDURE — 99214 OFFICE O/P EST MOD 30 MIN: CPT | Mod: S$GLB,,, | Performed by: INTERNAL MEDICINE

## 2019-11-18 PROCEDURE — 3077F SYST BP >= 140 MM HG: CPT | Mod: CPTII,S$GLB,, | Performed by: INTERNAL MEDICINE

## 2019-11-18 PROCEDURE — 99214 PR OFFICE/OUTPT VISIT, EST, LEVL IV, 30-39 MIN: ICD-10-PCS | Mod: S$GLB,,, | Performed by: INTERNAL MEDICINE

## 2019-11-18 PROCEDURE — 36415 COLL VENOUS BLD VENIPUNCTURE: CPT | Mod: PO

## 2019-11-18 PROCEDURE — 84153 ASSAY OF PSA TOTAL: CPT

## 2019-11-18 PROCEDURE — 84443 ASSAY THYROID STIM HORMONE: CPT

## 2019-11-18 PROCEDURE — 3080F PR MOST RECENT DIASTOLIC BLOOD PRESSURE >= 90 MM HG: ICD-10-PCS | Mod: CPTII,S$GLB,, | Performed by: INTERNAL MEDICINE

## 2019-11-18 PROCEDURE — 82378 CARCINOEMBRYONIC ANTIGEN: CPT

## 2019-11-18 PROCEDURE — 3077F PR MOST RECENT SYSTOLIC BLOOD PRESSURE >= 140 MM HG: ICD-10-PCS | Mod: CPTII,S$GLB,, | Performed by: INTERNAL MEDICINE

## 2019-11-18 PROCEDURE — 1101F PT FALLS ASSESS-DOCD LE1/YR: CPT | Mod: CPTII,S$GLB,, | Performed by: INTERNAL MEDICINE

## 2019-11-18 PROCEDURE — 80053 COMPREHEN METABOLIC PANEL: CPT

## 2019-11-18 PROCEDURE — 80061 LIPID PANEL: CPT

## 2019-11-18 PROCEDURE — 99999 PR PBB SHADOW E&M-EST. PATIENT-LVL III: ICD-10-PCS | Mod: PBBFAC,,, | Performed by: INTERNAL MEDICINE

## 2019-11-18 PROCEDURE — 85025 COMPLETE CBC W/AUTO DIFF WBC: CPT

## 2019-11-18 PROCEDURE — 3080F DIAST BP >= 90 MM HG: CPT | Mod: CPTII,S$GLB,, | Performed by: INTERNAL MEDICINE

## 2019-11-18 PROCEDURE — 1101F PR PT FALLS ASSESS DOC 0-1 FALLS W/OUT INJ PAST YR: ICD-10-PCS | Mod: CPTII,S$GLB,, | Performed by: INTERNAL MEDICINE

## 2019-11-18 PROCEDURE — 83036 HEMOGLOBIN GLYCOSYLATED A1C: CPT

## 2019-11-18 RX ORDER — SIMVASTATIN 20 MG/1
20 TABLET, FILM COATED ORAL DAILY
Qty: 90 TABLET | Refills: 3 | Status: SHIPPED | OUTPATIENT
Start: 2019-11-18 | End: 2020-10-28 | Stop reason: SDUPTHER

## 2019-11-18 RX ORDER — LOSARTAN POTASSIUM 100 MG/1
100 TABLET ORAL DAILY
Qty: 90 TABLET | Refills: 3 | Status: SHIPPED | OUTPATIENT
Start: 2019-11-18 | End: 2021-01-09

## 2019-11-18 RX ORDER — AMLODIPINE BESYLATE 5 MG/1
5 TABLET ORAL 2 TIMES DAILY
Qty: 60 TABLET | Refills: 11 | Status: SHIPPED | OUTPATIENT
Start: 2019-11-18 | End: 2021-01-09

## 2019-11-18 NOTE — TELEPHONE ENCOUNTER
Pt in office   Came in for b/p check  Right arm 193/101  Denies dizziness denies headache   Will recheck in 10 mins    Please advise----

## 2019-11-18 NOTE — PROGRESS NOTES
Subjective:       Patient ID: Marcelle Moore is a 74 y.o. male.    Chief Complaint: Hypertension    Walk in--------------------bp has been elevated--------------no other new symptoms today----------    Hypertension   Pertinent negatives include no chest pain, headaches, neck pain, palpitations or shortness of breath.     Past Medical History:   Diagnosis Date    Blood transfusion     Cataract     Colon cancer     Colon polyps     Hyperlipidemia     Hypertension      Past Surgical History:   Procedure Laterality Date    CATARACT EXTRACTION      COLECTOMY      COLONOSCOPY N/A 2016    Procedure: Colonoscopy;  Surgeon: Heath Steward MD;  Location: [facility] ENDO;  Service: Endoscopy;  Laterality: N/A;    COLONOSCOPY N/A 6/15/2017    Procedure: COLONOSCOPY;  Surgeon: Sheila Sandoval MD;  Location: [facility] ENDO;  Service: Endoscopy;  Laterality: N/A;    SHOULDER SURGERY       Family History   Problem Relation Age of Onset    Stroke Mother     Hypertension Mother     Diabetes Father     Breast cancer Sister     Colon cancer Paternal Grandfather      Social History     Socioeconomic History    Marital status:      Spouse name: Not on file    Number of children: Not on file    Years of education: Not on file    Highest education level: Not on file   Occupational History    Not on file   Social Needs    Financial resource strain: Not on file    Food insecurity:     Worry: Not on file     Inability: Not on file    Transportation needs:     Medical: Not on file     Non-medical: Not on file   Tobacco Use    Smoking status: Former Smoker     Last attempt to quit: 1967     Years since quittin.3    Smokeless tobacco: Never Used   Substance and Sexual Activity    Alcohol use: No    Drug use: No    Sexual activity: Not on file   Lifestyle    Physical activity:     Days per week: Not on file     Minutes per session: Not on file    Stress: Not on file   Relationships    Social  connections:     Talks on phone: Not on file     Gets together: Not on file     Attends Jehov[employer]'s witness service: Not on file     Active member of club or organization: Not on file     Attends meetings of clubs or organizations: Not on file     Relationship status: Not on file   Other Topics Concern    Not on file   Social History Narrative    Not on file     Review of Systems   Constitutional: Negative for activity change, appetite change, chills, diaphoresis, fatigue, fever and unexpected weight change.   HENT: Negative for drooling, ear discharge, ear pain, facial swelling, hearing loss, mouth sores, nosebleeds, postnasal drip, rhinorrhea, sinus pressure, sneezing, sore throat, tinnitus, trouble swallowing and voice change.    Eyes: Negative for photophobia, redness and visual disturbance.   Respiratory: Negative for apnea, cough, choking, chest tightness, shortness of breath and wheezing.    Cardiovascular: Negative for chest pain, palpitations and leg swelling.   Gastrointestinal: Negative for abdominal distention, abdominal pain, anal bleeding, blood in stool, constipation, diarrhea, nausea, rectal pain and vomiting.   Endocrine: Negative for cold intolerance, heat intolerance, polydipsia, polyphagia and polyuria.   Genitourinary: Negative for difficulty urinating, dysuria, enuresis, flank pain, frequency, genital sores, hematuria and urgency.   Musculoskeletal: Negative for arthralgias, back pain, gait problem, joint swelling, myalgias, neck pain and neck stiffness.   Skin: Negative for color change, pallor, rash and wound.   Allergic/Immunologic: Negative for food allergies and immunocompromised state.   Neurological: Negative for dizziness, tremors, seizures, syncope, facial asymmetry, speech difficulty, weakness, light-headedness, numbness and headaches.   Hematological: Negative for adenopathy. Does not bruise/bleed easily.   Psychiatric/Behavioral: Negative for agitation, behavioral problems, confusion,  decreased concentration, dysphoric mood, hallucinations, self-injury, sleep disturbance and suicidal ideas. The patient is not nervous/anxious and is not hyperactive.        Objective:      Physical Exam   Constitutional: He is oriented to person, place, and time. He appears well-developed and well-nourished. No distress.   HENT:   Head: Normocephalic and atraumatic.   Eyes: Pupils are equal, round, and reactive to light. No scleral icterus.   Neck: Normal range of motion. Neck supple. No JVD present. Carotid bruit is not present. No tracheal deviation present. No thyromegaly present.   Cardiovascular: Normal rate, regular rhythm, normal heart sounds and intact distal pulses.   Pulmonary/Chest: Effort normal and breath sounds normal. No respiratory distress. He has no wheezes. He has no rales. He exhibits no tenderness.   Abdominal: Soft. Bowel sounds are normal. He exhibits no distension. There is no tenderness. There is no rebound and no guarding.   Musculoskeletal: Normal range of motion. He exhibits no edema or tenderness.   Lymphadenopathy:     He has no cervical adenopathy.   Neurological: He is alert and oriented to person, place, and time.   Skin: Skin is warm and dry. No rash noted. He is not diaphoretic. No erythema. No pallor.   Psychiatric: He has a normal mood and affect. His behavior is normal. Judgment and thought content normal.   Nursing note and vitals reviewed.      CMP  Sodium   Date Value Ref Range Status   12/27/2017 138 136 - 145 mmol/L Final     Potassium   Date Value Ref Range Status   12/27/2017 4.8 3.5 - 5.1 mmol/L Final     Chloride   Date Value Ref Range Status   12/27/2017 106 95 - 110 mmol/L Final     CO2   Date Value Ref Range Status   12/27/2017 24 23 - 29 mmol/L Final     Glucose   Date Value Ref Range Status   12/27/2017 115 (H) 70 - 110 mg/dL Final     BUN, Bld   Date Value Ref Range Status   12/27/2017 12 8 - 23 mg/dL Final     Creatinine   Date Value Ref Range Status    12/27/2017 0.9 0.5 - 1.4 mg/dL Final     Calcium   Date Value Ref Range Status   12/27/2017 9.7 8.7 - 10.5 mg/dL Final     Total Protein   Date Value Ref Range Status   12/27/2017 7.1 6.0 - 8.4 g/dL Final     Albumin   Date Value Ref Range Status   12/27/2017 3.6 3.5 - 5.2 g/dL Final     Total Bilirubin   Date Value Ref Range Status   12/27/2017 0.7 0.1 - 1.0 mg/dL Final     Comment:     For infants and newborns, interpretation of results should be based  on gestational age, weight and in agreement with clinical  observations.  Premature Infant recommended reference ranges:  Up to 24 hours.............<8.0 mg/dL  Up to 48 hours............<12.0 mg/dL  3-5 days..................<15.0 mg/dL  6-29 days.................<15.0 mg/dL       Alkaline Phosphatase   Date Value Ref Range Status   12/27/2017 77 55 - 135 U/L Final     AST   Date Value Ref Range Status   12/27/2017 16 10 - 40 U/L Final     ALT   Date Value Ref Range Status   12/27/2017 22 10 - 44 U/L Final     Anion Gap   Date Value Ref Range Status   12/27/2017 8 8 - 16 mmol/L Final     eGFR if    Date Value Ref Range Status   12/27/2017 >60.0 >60 mL/min/1.73 m^2 Final     eGFR if non    Date Value Ref Range Status   12/27/2017 >60.0 >60 mL/min/1.73 m^2 Final     Comment:     Calculation used to obtain the estimated glomerular filtration  rate (eGFR) is the CKD-EPI equation.        Lab Results   Component Value Date    WBC 9.73 12/27/2017    HGB 15.0 12/27/2017    HCT 45.8 12/27/2017    MCV 92 12/27/2017     12/27/2017     Lab Results   Component Value Date    CHOL 137 12/27/2017     Lab Results   Component Value Date    HDL 32 (L) 12/27/2017     Lab Results   Component Value Date    LDLCALC 77.0 12/27/2017     Lab Results   Component Value Date    TRIG 140 12/27/2017     Lab Results   Component Value Date    CHOLHDL 23.4 12/27/2017     Lab Results   Component Value Date    TSH 1.974 01/27/2015     Lab Results   Component  Value Date    HGBA1C 6.6 (H) 12/27/2017     Assessment:       1. Essential hypertension    2. Hypercholesterolemia    3. IGT (impaired glucose tolerance)    4. Personal history of colon cancer    5. History of colon cancer    6. Encounter for prostate cancer screening    7. Noncompliance        Plan:   Essential hypertension-------------------add amlodipine -----------------follow----------  -     Comprehensive metabolic panel; Future; Expected date: 11/18/2019  -     CBC auto differential; Future; Expected date: 11/18/2019  -     TSH; Future; Expected date: 11/18/2019  -     amLODIPine (NORVASC) 5 MG tablet; Take 1 tablet (5 mg total) by mouth 2 (two) times daily.  Dispense: 60 tablet; Refill: 11    Hypercholesterolemia------statin----------  -     Lipid panel; Future; Expected date: 11/18/2019    IGT (impaired glucose tolerance)  -     Hemoglobin A1c; Future; Expected date: 11/18/2019    Personal history of colon cancer  -     Ambulatory referral to Gastroenterology  -     CEA; Future; Expected date: 11/18/2019    History of colon cancer  -     Ambulatory referral to Gastroenterology  -     CEA; Future; Expected date: 11/18/2019    Encounter for prostate cancer screening  -     PSA, Screening; Future; Expected date: 11/18/2019    Noncompliance----with f/u appts------------    F/u 2 weeks-

## 2019-11-18 NOTE — PROGRESS NOTES
Pt sitting   B/p taken on right arm 193/101  Pt denies dizziness, denies headache   Reports that b/p meds taken today 0630

## 2019-11-19 LAB
ESTIMATED AVG GLUCOSE: 140 MG/DL (ref 68–131)
HBA1C MFR BLD HPLC: 6.5 % (ref 4–5.6)

## 2019-11-21 ENCOUNTER — OFFICE VISIT (OUTPATIENT)
Dept: GASTROENTEROLOGY | Facility: CLINIC | Age: 74
End: 2019-11-21
Payer: COMMERCIAL

## 2019-11-21 VITALS
WEIGHT: 198 LBS | BODY MASS INDEX: 30.01 KG/M2 | SYSTOLIC BLOOD PRESSURE: 142 MMHG | HEART RATE: 72 BPM | HEIGHT: 68 IN | DIASTOLIC BLOOD PRESSURE: 82 MMHG

## 2019-11-21 DIAGNOSIS — Z12.11 COLON CANCER SCREENING: Primary | ICD-10-CM

## 2019-11-21 DIAGNOSIS — Z85.038 HISTORY OF COLON CANCER: ICD-10-CM

## 2019-11-21 PROCEDURE — 99999 PR PBB SHADOW E&M-EST. PATIENT-LVL III: CPT | Mod: PBBFAC,,, | Performed by: NURSE PRACTITIONER

## 2019-11-21 PROCEDURE — 99499 NO LOS: ICD-10-PCS | Mod: S$GLB,,, | Performed by: NURSE PRACTITIONER

## 2019-11-21 PROCEDURE — 99499 UNLISTED E&M SERVICE: CPT | Mod: S$GLB,,, | Performed by: NURSE PRACTITIONER

## 2019-11-21 PROCEDURE — 99999 PR PBB SHADOW E&M-EST. PATIENT-LVL III: ICD-10-PCS | Mod: PBBFAC,,, | Performed by: NURSE PRACTITIONER

## 2019-11-21 NOTE — LETTER
November 21, 2019      El Barrios MD  [address] [place] [address]           [facility]  [address]  [place] [address]  Phone: [phone]  Fax: [phone]          Patient: Marcelle Moore   MR Number: [id number]   YOB: 1945   Date of Visit: 11/21/2019       Dear Dr. El Barrios:    Thank you for referring Marcelle Moore to me for evaluation. Attached you will find relevant portions of my assessment and plan of care.    If you have questions, please do not hesitate to call me. I look forward to following Marcelle Moore along with you.    Sincerely,    Adriana Lin, NP    Enclosure  CC:  No Recipients    If you would like to receive this communication electronically, please contact [email or web address] or [phone] to request more information on [email or web address] Link access.    For providers and/or their staff who would like to refer a patient to [facility], please contact us through our one-stop-shop provider referral line, [facility] , at [phone].    If you feel you have received this communication in error or would no longer like to receive these types of communications, please e-mail [email or web address]

## 2019-11-21 NOTE — PROGRESS NOTES
Clinic Consult:  [facility] Consultation Note    Reason for Consult:  The primary encounter diagnosis was Colon cancer screening. A diagnosis of History of colon cancer was also pertinent to this visit.    PCP: El Barrios   [address] / [place] [address]    HPI:  This is a 74 y.o. male here for evaluation of the above. He presents to clinic for colon cancer screening. He has a history of colon cancer in 2009. His last colonoscopy was done in June 2017 and revealed two polyps in the sigmoid colon. Resected and retrieved. Two tattoos seen (hepatic flexure and rectum). Recall in 1 year. He denies any current GI complaints. No abdominal pain, hematochezia, melena, nausea, vomiting, or weight loss.      Review of Systems   Constitutional: Negative for fever, malaise/fatigue and weight loss.   HENT: Negative for sore throat.    Respiratory: Negative for cough and wheezing.    Cardiovascular: Negative for chest pain and palpitations.   Gastrointestinal: Negative for abdominal pain, blood in stool, constipation, diarrhea, heartburn, melena, nausea and vomiting.   Genitourinary: Negative for dysuria and frequency.   Musculoskeletal: Negative for back pain, joint pain, myalgias and neck pain.   Skin: Negative for itching and rash.   Neurological: Negative for dizziness, speech change, seizures, loss of consciousness and headaches.   Psychiatric/Behavioral: Negative for depression and substance abuse. The patient is not nervous/anxious.        Medical History:  has a past medical history of Blood transfusion, Cataract, Colon cancer, Colon polyps, Hyperlipidemia, and Hypertension.    Surgical History:  has a past surgical history that includes Shoulder surgery; Colectomy; Cataract extraction; Colonoscopy (N/A, 6/23/2016); and Colonoscopy (N/A, 6/15/2017).    Family History: family history includes Breast cancer in his sister; Colon cancer in his paternal grandfather; Diabetes in his father;  "Hypertension in his mother; Stroke in his mother..     Social History:  reports that he quit smoking about 52 years ago. He has never used smokeless tobacco. He reports that he does not drink alcohol or use drugs.    Allergies: Reviewed    Home Medications:   Current Outpatient Medications on File Prior to Visit   Medication Sig Dispense Refill    amLODIPine (NORVASC) 5 MG tablet Take 1 tablet (5 mg total) by mouth 2 (two) times daily. 60 tablet 11    losartan (COZAAR) 100 MG tablet Take 1 tablet (100 mg total) by mouth once daily. 90 tablet 3    simvastatin (ZOCOR) 20 MG tablet Take 1 tablet (20 mg total) by mouth once daily. 90 tablet 3     No current facility-administered medications on file prior to visit.        Physical Exam:  BP (!) 142/82   Pulse 72   Ht 5' 8" (1.727 m)   Wt 89.8 kg (197 lb 15.6 oz)   BMI 30.10 kg/m²   Body mass index is 30.1 kg/m².  Physical Exam   Constitutional: He is oriented to person, place, and time and well-developed, well-nourished, and in no distress. No distress.   HENT:   Head: Normocephalic.   Eyes: Pupils are equal, round, and reactive to light. Conjunctivae are normal.   Cardiovascular: Normal rate, regular rhythm and normal heart sounds.   Pulmonary/Chest: Effort normal and breath sounds normal. No respiratory distress.   Abdominal: Soft. Bowel sounds are normal. He exhibits no distension. There is no tenderness.   Neurological: He is alert and oriented to person, place, and time. No cranial nerve deficit.   Skin: Skin is warm and dry. No rash noted.   Psychiatric: Mood and affect normal.       Labs: Pertinent labs reviewed.    Assessment:  1. Colon cancer screening    2. History of colon cancer         Recommendations:  - needs colonoscopy with Miralax prep. Insurance would not cover suprep.   -     Case request GI: COLONOSCOPY    Follow up to be determined after procedure.    Thank you so much for allowing me to participate in the care of Marcelle Moore  Adriana" DETSINI Lin

## 2019-11-22 ENCOUNTER — TELEPHONE (OUTPATIENT)
Dept: ENDOSCOPY | Facility: HOSPITAL | Age: 74
End: 2019-11-22

## 2019-11-22 NOTE — TELEPHONE ENCOUNTER
Attempted to schedule procedure. Spoke with patient's wife (Deborah) and she will have pt to call when ready. maria esther

## 2019-11-29 ENCOUNTER — TELEPHONE (OUTPATIENT)
Dept: ENDOSCOPY | Facility: HOSPITAL | Age: 74
End: 2019-11-29

## 2019-11-29 NOTE — TELEPHONE ENCOUNTER
Attempted to schedule procedure, no answer. Left message to return call number provider. maria esther

## 2019-12-03 ENCOUNTER — HOSPITAL ENCOUNTER (OUTPATIENT)
Dept: RADIOLOGY | Facility: HOSPITAL | Age: 74
Discharge: HOME | End: 2019-12-03
Attending: INTERNAL MEDICINE
Payer: COMMERCIAL

## 2019-12-03 ENCOUNTER — OFFICE VISIT (OUTPATIENT)
Dept: FAMILY MEDICINE | Facility: CLINIC | Age: 74
End: 2019-12-03
Payer: COMMERCIAL

## 2019-12-03 VITALS
DIASTOLIC BLOOD PRESSURE: 76 MMHG | WEIGHT: 199.63 LBS | OXYGEN SATURATION: 96 % | SYSTOLIC BLOOD PRESSURE: 138 MMHG | TEMPERATURE: 98 F | HEART RATE: 77 BPM | BODY MASS INDEX: 30.35 KG/M2

## 2019-12-03 DIAGNOSIS — G89.29 CHRONIC MIDLINE LOW BACK PAIN WITH BILATERAL SCIATICA: ICD-10-CM

## 2019-12-03 DIAGNOSIS — R97.0 ELEVATED CEA: ICD-10-CM

## 2019-12-03 DIAGNOSIS — M54.41 CHRONIC MIDLINE LOW BACK PAIN WITH BILATERAL SCIATICA: ICD-10-CM

## 2019-12-03 DIAGNOSIS — M54.42 CHRONIC MIDLINE LOW BACK PAIN WITH BILATERAL SCIATICA: ICD-10-CM

## 2019-12-03 DIAGNOSIS — Z85.038 HISTORY OF COLON CANCER: Primary | ICD-10-CM

## 2019-12-03 DIAGNOSIS — I10 ESSENTIAL HYPERTENSION: ICD-10-CM

## 2019-12-03 DIAGNOSIS — E11.65 TYPE 2 DIABETES MELLITUS WITH HYPERGLYCEMIA, WITHOUT LONG-TERM CURRENT USE OF INSULIN: ICD-10-CM

## 2019-12-03 DIAGNOSIS — E78.00 HYPERCHOLESTEROLEMIA: ICD-10-CM

## 2019-12-03 PROBLEM — M25.552 PAIN OF BOTH HIP JOINTS: Status: ACTIVE | Noted: 2019-12-03

## 2019-12-03 PROBLEM — M54.2 NECK PAIN: Status: ACTIVE | Noted: 2019-12-03

## 2019-12-03 PROBLEM — M25.551 PAIN OF BOTH HIP JOINTS: Status: ACTIVE | Noted: 2019-12-03

## 2019-12-03 PROCEDURE — 1159F MED LIST DOCD IN RCRD: CPT | Mod: S$GLB,,, | Performed by: INTERNAL MEDICINE

## 2019-12-03 PROCEDURE — 1101F PR PT FALLS ASSESS DOC 0-1 FALLS W/OUT INJ PAST YR: ICD-10-PCS | Mod: CPTII,S$GLB,, | Performed by: INTERNAL MEDICINE

## 2019-12-03 PROCEDURE — 3044F HG A1C LEVEL LT 7.0%: CPT | Mod: CPTII,S$GLB,, | Performed by: INTERNAL MEDICINE

## 2019-12-03 PROCEDURE — 3075F SYST BP GE 130 - 139MM HG: CPT | Mod: CPTII,S$GLB,, | Performed by: INTERNAL MEDICINE

## 2019-12-03 PROCEDURE — 1126F PR PAIN SEVERITY QUANTIFIED, NO PAIN PRESENT: ICD-10-PCS | Mod: S$GLB,,, | Performed by: INTERNAL MEDICINE

## 2019-12-03 PROCEDURE — 3075F PR MOST RECENT SYSTOLIC BLOOD PRESS GE 130-139MM HG: ICD-10-PCS | Mod: CPTII,S$GLB,, | Performed by: INTERNAL MEDICINE

## 2019-12-03 PROCEDURE — 99214 OFFICE O/P EST MOD 30 MIN: CPT | Mod: S$GLB,,, | Performed by: INTERNAL MEDICINE

## 2019-12-03 PROCEDURE — 1159F PR MEDICATION LIST DOCUMENTED IN MEDICAL RECORD: ICD-10-PCS | Mod: S$GLB,,, | Performed by: INTERNAL MEDICINE

## 2019-12-03 PROCEDURE — 1126F AMNT PAIN NOTED NONE PRSNT: CPT | Mod: S$GLB,,, | Performed by: INTERNAL MEDICINE

## 2019-12-03 PROCEDURE — 99999 PR PBB SHADOW E&M-EST. PATIENT-LVL IV: ICD-10-PCS | Mod: PBBFAC,,, | Performed by: INTERNAL MEDICINE

## 2019-12-03 PROCEDURE — 3078F DIAST BP <80 MM HG: CPT | Mod: CPTII,S$GLB,, | Performed by: INTERNAL MEDICINE

## 2019-12-03 PROCEDURE — 3078F PR MOST RECENT DIASTOLIC BLOOD PRESSURE < 80 MM HG: ICD-10-PCS | Mod: CPTII,S$GLB,, | Performed by: INTERNAL MEDICINE

## 2019-12-03 PROCEDURE — 99214 PR OFFICE/OUTPT VISIT, EST, LEVL IV, 30-39 MIN: ICD-10-PCS | Mod: S$GLB,,, | Performed by: INTERNAL MEDICINE

## 2019-12-03 PROCEDURE — 3044F PR MOST RECENT HEMOGLOBIN A1C LEVEL <7.0%: ICD-10-PCS | Mod: CPTII,S$GLB,, | Performed by: INTERNAL MEDICINE

## 2019-12-03 PROCEDURE — 99999 PR PBB SHADOW E&M-EST. PATIENT-LVL IV: CPT | Mod: PBBFAC,,, | Performed by: INTERNAL MEDICINE

## 2019-12-03 PROCEDURE — 1101F PT FALLS ASSESS-DOCD LE1/YR: CPT | Mod: CPTII,S$GLB,, | Performed by: INTERNAL MEDICINE

## 2019-12-03 RX ORDER — GABAPENTIN 300 MG/1
300 CAPSULE ORAL NIGHTLY
Qty: 30 CAPSULE | Refills: 2 | Status: SHIPPED | OUTPATIENT
Start: 2019-12-03 | End: 2019-12-03

## 2019-12-03 RX ORDER — METFORMIN HYDROCHLORIDE 500 MG/1
500 TABLET, EXTENDED RELEASE ORAL
Qty: 90 TABLET | Refills: 3 | Status: SHIPPED | OUTPATIENT
Start: 2019-12-03 | End: 2020-12-02

## 2019-12-03 NOTE — PROGRESS NOTES
Subjective:       Patient ID: Marcelle Moore is a 74 y.o. male.    Chief Complaint: Hypertension; Follow-up; Hyperlipidemia; and Diabetes    Hypertension   Pertinent negatives include no chest pain, headaches, neck pain, palpitations or shortness of breath.   Follow-up   Pertinent negatives include no abdominal pain, arthralgias, chest pain, chills, coughing, diaphoresis, fatigue, fever, headaches, joint swelling, myalgias, nausea, neck pain, numbness, rash, sore throat, vomiting or weakness.   Hyperlipidemia   Pertinent negatives include no chest pain, myalgias or shortness of breath.   Diabetes   Pertinent negatives for hypoglycemia include no confusion, dizziness, headaches, nervousness/anxiousness, pallor, seizures, speech difficulty or tremors. Pertinent negatives for diabetes include no chest pain, no fatigue, no polydipsia, no polyphagia, no polyuria and no weakness.     Past Medical History:   Diagnosis Date    Blood transfusion     Cataract     Colon cancer     Colon polyps     Hyperlipidemia     Hypertension      Past Surgical History:   Procedure Laterality Date    CATARACT EXTRACTION      COLECTOMY      COLONOSCOPY N/A 6/23/2016    Procedure: Colonoscopy;  Surgeon: Heath Steward MD;  Location: [facility];  Service: Endoscopy;  Laterality: N/A;    COLONOSCOPY N/A 6/15/2017    Procedure: COLONOSCOPY;  Surgeon: Sheila Sandoval MD;  Location: [facility];  Service: Endoscopy;  Laterality: N/A;    SHOULDER SURGERY       Family History   Problem Relation Age of Onset    Stroke Mother     Hypertension Mother     Diabetes Father     Breast cancer Sister     Colon cancer Paternal Grandfather      Social History     Socioeconomic History    Marital status:      Spouse name: Not on file    Number of children: Not on file    Years of education: Not on file    Highest education level: Not on file   Occupational History    Not on file   Social Needs    Financial resource strain:  Not on file    Food insecurity:     Worry: Not on file     Inability: Not on file    Transportation needs:     Medical: Not on file     Non-medical: Not on file   Tobacco Use    Smoking status: Former Smoker     Last attempt to quit: 1967     Years since quittin.3    Smokeless tobacco: Never Used    Tobacco comment: over 40 years   Substance and Sexual Activity    Alcohol use: No    Drug use: No    Sexual activity: Not on file   Lifestyle    Physical activity:     Days per week: Not on file     Minutes per session: Not on file    Stress: Not on file   Relationships    Social connections:     Talks on phone: Not on file     Gets together: Not on file     Attends Nondenominational service: Not on file     Active member of club or organization: Not on file     Attends meetings of clubs or organizations: Not on file     Relationship status: Not on file   Other Topics Concern    Not on file   Social History Narrative    Not on file     Review of Systems   Constitutional: Negative for activity change, appetite change, chills, diaphoresis, fatigue, fever and unexpected weight change.   HENT: Negative for drooling, ear discharge, ear pain, facial swelling, hearing loss, mouth sores, nosebleeds, postnasal drip, rhinorrhea, sinus pressure, sneezing, sore throat, tinnitus, trouble swallowing and voice change.    Eyes: Negative for photophobia, redness and visual disturbance.   Respiratory: Negative for apnea, cough, choking, chest tightness, shortness of breath and wheezing.    Cardiovascular: Negative for chest pain and palpitations.   Gastrointestinal: Negative for abdominal distention, abdominal pain, anal bleeding, blood in stool, constipation, diarrhea, nausea and vomiting.   Endocrine: Negative for cold intolerance, heat intolerance, polydipsia, polyphagia and polyuria.   Genitourinary: Negative for difficulty urinating, dysuria, enuresis, flank pain, frequency, genital sores, hematuria and urgency.    Musculoskeletal: Negative for arthralgias, back pain, gait problem, joint swelling, myalgias, neck pain and neck stiffness.   Skin: Negative for color change, pallor, rash and wound.   Allergic/Immunologic: Negative for food allergies and immunocompromised state.   Neurological: Negative for dizziness, tremors, seizures, syncope, facial asymmetry, speech difficulty, weakness, light-headedness, numbness and headaches.   Hematological: Negative for adenopathy. Does not bruise/bleed easily.   Psychiatric/Behavioral: Negative for agitation, behavioral problems, confusion, decreased concentration, dysphoric mood, hallucinations, self-injury, sleep disturbance and suicidal ideas. The patient is not nervous/anxious and is not hyperactive.        Objective:      Physical Exam   Constitutional: He is oriented to person, place, and time. He appears well-developed and well-nourished. No distress.   HENT:   Head: Normocephalic and atraumatic.   Eyes: Pupils are equal, round, and reactive to light.   Neck: Normal range of motion. Neck supple. No JVD present. Carotid bruit is not present. No tracheal deviation present. No thyromegaly present.   Cardiovascular: Normal rate, regular rhythm, normal heart sounds and intact distal pulses.   Pulmonary/Chest: Effort normal and breath sounds normal. No respiratory distress. He has no wheezes. He has no rales. He exhibits no tenderness.   Abdominal: Soft. Bowel sounds are normal. He exhibits no distension. There is no tenderness. There is no rebound and no guarding.   Musculoskeletal: Normal range of motion. He exhibits no edema or tenderness.   Lymphadenopathy:     He has no cervical adenopathy.   Neurological: He is alert and oriented to person, place, and time.   Skin: Skin is warm and dry. No rash noted. He is not diaphoretic. No erythema. No pallor.   Psychiatric: He has a normal mood and affect. His behavior is normal. Judgment and thought content normal.   Nursing note and vitals  reviewed.      CMP  Sodium   Date Value Ref Range Status   11/18/2019 141 136 - 145 mmol/L Final     Potassium   Date Value Ref Range Status   11/18/2019 4.5 3.5 - 5.1 mmol/L Final     Chloride   Date Value Ref Range Status   11/18/2019 106 95 - 110 mmol/L Final     CO2   Date Value Ref Range Status   11/18/2019 27 23 - 29 mmol/L Final     Glucose   Date Value Ref Range Status   11/18/2019 128 (H) 70 - 110 mg/dL Final     BUN, Bld   Date Value Ref Range Status   11/18/2019 13 8 - 23 mg/dL Final     Creatinine   Date Value Ref Range Status   11/18/2019 0.9 0.5 - 1.4 mg/dL Final     Calcium   Date Value Ref Range Status   11/18/2019 9.5 8.7 - 10.5 mg/dL Final     Total Protein   Date Value Ref Range Status   11/18/2019 7.2 6.0 - 8.4 g/dL Final     Albumin   Date Value Ref Range Status   11/18/2019 4.2 3.5 - 5.2 g/dL Final     Total Bilirubin   Date Value Ref Range Status   11/18/2019 1.0 0.1 - 1.0 mg/dL Final     Comment:     For infants and newborns, interpretation of results should be based  on gestational age, weight and in agreement with clinical  observations.  Premature Infant recommended reference ranges:  Up to 24 hours.............<8.0 mg/dL  Up to 48 hours............<12.0 mg/dL  3-5 days..................<15.0 mg/dL  6-29 days.................<15.0 mg/dL       Alkaline Phosphatase   Date Value Ref Range Status   11/18/2019 68 55 - 135 U/L Final     AST   Date Value Ref Range Status   11/18/2019 18 10 - 40 U/L Final     ALT   Date Value Ref Range Status   11/18/2019 23 10 - 44 U/L Final     Anion Gap   Date Value Ref Range Status   11/18/2019 8 8 - 16 mmol/L Final     eGFR if    Date Value Ref Range Status   11/18/2019 >60.0 >60 mL/min/1.73 m^2 Final     eGFR if non    Date Value Ref Range Status   11/18/2019 >60.0 >60 mL/min/1.73 m^2 Final     Comment:     Calculation used to obtain the estimated glomerular filtration  rate (eGFR) is the CKD-EPI equation.        Lab Results    Component Value Date    WBC 8.04 11/18/2019    HGB 15.8 11/18/2019    HCT 49.2 11/18/2019    MCV 96 11/18/2019     11/18/2019     Lab Results   Component Value Date    CHOL 123 11/18/2019     Lab Results   Component Value Date    HDL 38 (L) 11/18/2019     Lab Results   Component Value Date    LDLCALC 56.2 (L) 11/18/2019     Lab Results   Component Value Date    TRIG 144 11/18/2019     Lab Results   Component Value Date    CHOLHDL 30.9 11/18/2019     Lab Results   Component Value Date    TSH 2.269 11/18/2019     Lab Results   Component Value Date    HGBA1C 6.5 (H) 11/18/2019     Assessment:       1. History of colon cancer    2. Elevated CEA    3. Essential hypertension    4. Hypercholesterolemia    5. Type 2 diabetes mellitus with hyperglycemia, without long-term current use of insulin        Plan:   History of colon cancer--------------advised to f/u with gi for colon-----------    Elevated CEA    Essential hypertension-------------------stable--------------continue meds--------    Hypercholesterolemia--------------stable-------------------continue med--------    Type 2 diabetes mellitus with hyperglycemia, without long-term current use of insulin------start metformin------------follow-  -     metFORMIN (GLUCOPHAGE-XR) 500 MG 24 hr tablet; Take 1 tablet (500 mg total) by mouth daily with breakfast.  Dispense: 90 tablet; Refill: 3    Other orders  -     Discontinue: gabapentin (NEURONTIN) 300 MG capsule; Take 1 capsule (300 mg total) by mouth every evening.  Dispense: 30 capsule; Refill: 2  -     Cancel: Ambulatory referral to Pain Clinic  -     Cancel: X-Ray Lumbar Spine AP And Lateral; Future; Expected date: 12/03/2019    F/u 3 months-----------

## 2020-02-24 RX ORDER — GABAPENTIN 300 MG/1
CAPSULE ORAL
Qty: 90 CAPSULE | Refills: 0 | Status: SHIPPED | OUTPATIENT
Start: 2020-02-24 | End: 2020-05-24

## 2020-03-03 ENCOUNTER — OFFICE VISIT (OUTPATIENT)
Dept: FAMILY MEDICINE | Facility: CLINIC | Age: 75
End: 2020-03-03
Payer: COMMERCIAL

## 2020-03-03 ENCOUNTER — LAB VISIT (OUTPATIENT)
Dept: LAB | Facility: HOSPITAL | Age: 75
End: 2020-03-03
Attending: INTERNAL MEDICINE
Payer: COMMERCIAL

## 2020-03-03 VITALS
HEIGHT: 68 IN | BODY MASS INDEX: 30.34 KG/M2 | WEIGHT: 200.19 LBS | OXYGEN SATURATION: 99 % | DIASTOLIC BLOOD PRESSURE: 72 MMHG | HEART RATE: 79 BPM | SYSTOLIC BLOOD PRESSURE: 132 MMHG | TEMPERATURE: 99 F

## 2020-03-03 DIAGNOSIS — I10 ESSENTIAL HYPERTENSION: Primary | ICD-10-CM

## 2020-03-03 DIAGNOSIS — E11.65 TYPE 2 DIABETES MELLITUS WITH HYPERGLYCEMIA, WITHOUT LONG-TERM CURRENT USE OF INSULIN: ICD-10-CM

## 2020-03-03 DIAGNOSIS — Z91.199 NONCOMPLIANCE: ICD-10-CM

## 2020-03-03 DIAGNOSIS — E78.00 HYPERCHOLESTEROLEMIA: ICD-10-CM

## 2020-03-03 DIAGNOSIS — I10 ESSENTIAL HYPERTENSION: ICD-10-CM

## 2020-03-03 DIAGNOSIS — R97.0 ELEVATED CEA: ICD-10-CM

## 2020-03-03 DIAGNOSIS — Z85.038 HISTORY OF COLON CANCER: ICD-10-CM

## 2020-03-03 LAB
ALBUMIN SERPL BCP-MCNC: 4.3 G/DL (ref 3.5–5.2)
ALP SERPL-CCNC: 64 U/L (ref 55–135)
ALT SERPL W/O P-5'-P-CCNC: 25 U/L (ref 10–44)
ANION GAP SERPL CALC-SCNC: 10 MMOL/L (ref 8–16)
AST SERPL-CCNC: 23 U/L (ref 10–40)
BASOPHILS # BLD AUTO: 0.07 K/UL (ref 0–0.2)
BASOPHILS NFR BLD: 0.7 % (ref 0–1.9)
BILIRUB SERPL-MCNC: 0.8 MG/DL (ref 0.1–1)
BUN SERPL-MCNC: 16 MG/DL (ref 8–23)
CALCIUM SERPL-MCNC: 9.7 MG/DL (ref 8.7–10.5)
CEA SERPL-MCNC: 5.3 NG/ML (ref 0–5)
CHLORIDE SERPL-SCNC: 106 MMOL/L (ref 95–110)
CO2 SERPL-SCNC: 25 MMOL/L (ref 23–29)
CREAT SERPL-MCNC: 1.2 MG/DL (ref 0.5–1.4)
DIFFERENTIAL METHOD: ABNORMAL
EOSINOPHIL # BLD AUTO: 0.3 K/UL (ref 0–0.5)
EOSINOPHIL NFR BLD: 3.2 % (ref 0–8)
ERYTHROCYTE [DISTWIDTH] IN BLOOD BY AUTOMATED COUNT: 14.6 % (ref 11.5–14.5)
EST. GFR  (AFRICAN AMERICAN): >60 ML/MIN/1.73 M^2
EST. GFR  (NON AFRICAN AMERICAN): 58.8 ML/MIN/1.73 M^2
GLUCOSE SERPL-MCNC: 121 MG/DL (ref 70–110)
HCT VFR BLD AUTO: 48.1 % (ref 40–54)
HGB BLD-MCNC: 15.2 G/DL (ref 14–18)
IMM GRANULOCYTES # BLD AUTO: 0.04 K/UL (ref 0–0.04)
IMM GRANULOCYTES NFR BLD AUTO: 0.4 % (ref 0–0.5)
LYMPHOCYTES # BLD AUTO: 2.1 K/UL (ref 1–4.8)
LYMPHOCYTES NFR BLD: 22.1 % (ref 18–48)
MCH RBC QN AUTO: 30.8 PG (ref 27–31)
MCHC RBC AUTO-ENTMCNC: 31.6 G/DL (ref 32–36)
MCV RBC AUTO: 98 FL (ref 82–98)
MONOCYTES # BLD AUTO: 1 K/UL (ref 0.3–1)
MONOCYTES NFR BLD: 10.2 % (ref 4–15)
NEUTROPHILS # BLD AUTO: 6 K/UL (ref 1.8–7.7)
NEUTROPHILS NFR BLD: 63.4 % (ref 38–73)
NRBC BLD-RTO: 0 /100 WBC
PLATELET # BLD AUTO: 180 K/UL (ref 150–350)
PMV BLD AUTO: 10.5 FL (ref 9.2–12.9)
POTASSIUM SERPL-SCNC: 4.5 MMOL/L (ref 3.5–5.1)
PROT SERPL-MCNC: 7.1 G/DL (ref 6–8.4)
RBC # BLD AUTO: 4.93 M/UL (ref 4.6–6.2)
SODIUM SERPL-SCNC: 141 MMOL/L (ref 136–145)
WBC # BLD AUTO: 9.49 K/UL (ref 3.9–12.7)

## 2020-03-03 PROCEDURE — 1101F PT FALLS ASSESS-DOCD LE1/YR: CPT | Mod: CPTII,S$GLB,, | Performed by: INTERNAL MEDICINE

## 2020-03-03 PROCEDURE — 99999 PR PBB SHADOW E&M-EST. PATIENT-LVL III: ICD-10-PCS | Mod: PBBFAC,,, | Performed by: INTERNAL MEDICINE

## 2020-03-03 PROCEDURE — 3075F PR MOST RECENT SYSTOLIC BLOOD PRESS GE 130-139MM HG: ICD-10-PCS | Mod: CPTII,S$GLB,, | Performed by: INTERNAL MEDICINE

## 2020-03-03 PROCEDURE — 1126F PR PAIN SEVERITY QUANTIFIED, NO PAIN PRESENT: ICD-10-PCS | Mod: S$GLB,,, | Performed by: INTERNAL MEDICINE

## 2020-03-03 PROCEDURE — 1159F PR MEDICATION LIST DOCUMENTED IN MEDICAL RECORD: ICD-10-PCS | Mod: S$GLB,,, | Performed by: INTERNAL MEDICINE

## 2020-03-03 PROCEDURE — 99999 PR PBB SHADOW E&M-EST. PATIENT-LVL III: CPT | Mod: PBBFAC,,, | Performed by: INTERNAL MEDICINE

## 2020-03-03 PROCEDURE — 3044F PR MOST RECENT HEMOGLOBIN A1C LEVEL <7.0%: ICD-10-PCS | Mod: CPTII,S$GLB,, | Performed by: INTERNAL MEDICINE

## 2020-03-03 PROCEDURE — 99215 OFFICE O/P EST HI 40 MIN: CPT | Mod: S$GLB,,, | Performed by: INTERNAL MEDICINE

## 2020-03-03 PROCEDURE — 3044F HG A1C LEVEL LT 7.0%: CPT | Mod: CPTII,S$GLB,, | Performed by: INTERNAL MEDICINE

## 2020-03-03 PROCEDURE — 80053 COMPREHEN METABOLIC PANEL: CPT

## 2020-03-03 PROCEDURE — 83036 HEMOGLOBIN GLYCOSYLATED A1C: CPT

## 2020-03-03 PROCEDURE — 36415 COLL VENOUS BLD VENIPUNCTURE: CPT | Mod: PO

## 2020-03-03 PROCEDURE — 3078F DIAST BP <80 MM HG: CPT | Mod: CPTII,S$GLB,, | Performed by: INTERNAL MEDICINE

## 2020-03-03 PROCEDURE — 85025 COMPLETE CBC W/AUTO DIFF WBC: CPT

## 2020-03-03 PROCEDURE — 1126F AMNT PAIN NOTED NONE PRSNT: CPT | Mod: S$GLB,,, | Performed by: INTERNAL MEDICINE

## 2020-03-03 PROCEDURE — 1101F PR PT FALLS ASSESS DOC 0-1 FALLS W/OUT INJ PAST YR: ICD-10-PCS | Mod: CPTII,S$GLB,, | Performed by: INTERNAL MEDICINE

## 2020-03-03 PROCEDURE — 99215 PR OFFICE/OUTPT VISIT, EST, LEVL V, 40-54 MIN: ICD-10-PCS | Mod: S$GLB,,, | Performed by: INTERNAL MEDICINE

## 2020-03-03 PROCEDURE — 1159F MED LIST DOCD IN RCRD: CPT | Mod: S$GLB,,, | Performed by: INTERNAL MEDICINE

## 2020-03-03 PROCEDURE — 82378 CARCINOEMBRYONIC ANTIGEN: CPT

## 2020-03-03 PROCEDURE — 3078F PR MOST RECENT DIASTOLIC BLOOD PRESSURE < 80 MM HG: ICD-10-PCS | Mod: CPTII,S$GLB,, | Performed by: INTERNAL MEDICINE

## 2020-03-03 PROCEDURE — 3075F SYST BP GE 130 - 139MM HG: CPT | Mod: CPTII,S$GLB,, | Performed by: INTERNAL MEDICINE

## 2020-03-03 NOTE — PROGRESS NOTES
Subjective:       Patient ID: Marcelle Moore is a 74 y.o. male.    Chief Complaint: Follow-up (3mo f/u); Hypertension; Hyperlipidemia; and Diabetes    Follow-up   Associated symptoms include arthralgias and myalgias. Pertinent negatives include no abdominal pain, chest pain, chills, coughing, diaphoresis, fatigue, fever, headaches, joint swelling, nausea, neck pain, numbness, rash, sore throat, vomiting or weakness.   Hypertension   Pertinent negatives include no chest pain, headaches, neck pain, palpitations or shortness of breath.   Hyperlipidemia   Associated symptoms include myalgias. Pertinent negatives include no chest pain or shortness of breath.   Diabetes   Pertinent negatives for hypoglycemia include no confusion, dizziness, headaches, nervousness/anxiousness, pallor, seizures, speech difficulty or tremors. Pertinent negatives for diabetes include no chest pain, no fatigue, no polydipsia, no polyphagia, no polyuria and no weakness.     Past Medical History:   Diagnosis Date    Blood transfusion     Cataract     Colon cancer     Colon polyps     Hyperlipidemia     Hypertension      Past Surgical History:   Procedure Laterality Date    CATARACT EXTRACTION      COLECTOMY      COLONOSCOPY N/A 6/23/2016    Procedure: Colonoscopy;  Surgeon: Heath Steward MD;  Location: [facility];  Service: Endoscopy;  Laterality: N/A;    COLONOSCOPY N/A 6/15/2017    Procedure: COLONOSCOPY;  Surgeon: Sheila Sandoval MD;  Location: [facility];  Service: Endoscopy;  Laterality: N/A;    SHOULDER SURGERY       Family History   Problem Relation Age of Onset    Stroke Mother     Hypertension Mother     Diabetes Father     Breast cancer Sister     Colon cancer Paternal Grandfather      Social History     Socioeconomic History    Marital status:      Spouse name: Not on file    Number of children: Not on file    Years of education: Not on file    Highest education level: Not on file   Occupational  History    Not on file   Social Needs    Financial resource strain: Not on file    Food insecurity:     Worry: Not on file     Inability: Not on file    Transportation needs:     Medical: Not on file     Non-medical: Not on file   Tobacco Use    Smoking status: Former Smoker     Last attempt to quit: 1967     Years since quittin.6    Smokeless tobacco: Never Used    Tobacco comment: over 40 years   Substance and Sexual Activity    Alcohol use: No    Drug use: No    Sexual activity: Not on file   Lifestyle    Physical activity:     Days per week: Not on file     Minutes per session: Not on file    Stress: Not on file   Relationships    Social connections:     Talks on phone: Not on file     Gets together: Not on file     Attends Shinto service: Not on file     Active member of club or organization: Not on file     Attends meetings of clubs or organizations: Not on file     Relationship status: Not on file   Other Topics Concern    Not on file   Social History Narrative    Not on file     Review of Systems   Constitutional: Negative for activity change, appetite change, chills, diaphoresis, fatigue, fever and unexpected weight change.   HENT: Negative for drooling, ear discharge, ear pain, facial swelling, hearing loss, mouth sores, nosebleeds, postnasal drip, rhinorrhea, sinus pressure, sneezing, sore throat, tinnitus, trouble swallowing and voice change.    Eyes: Negative for photophobia, redness and visual disturbance.   Respiratory: Negative for apnea, cough, choking, chest tightness, shortness of breath and wheezing.    Cardiovascular: Negative for chest pain and palpitations.   Gastrointestinal: Negative for abdominal distention, abdominal pain, anal bleeding, blood in stool, constipation, diarrhea, nausea and vomiting.   Endocrine: Negative for cold intolerance, heat intolerance, polydipsia, polyphagia and polyuria.   Genitourinary: Negative for difficulty urinating, dysuria,  enuresis, flank pain, frequency, genital sores, hematuria and urgency.   Musculoskeletal: Positive for arthralgias and myalgias. Negative for back pain, gait problem, joint swelling, neck pain and neck stiffness.   Skin: Negative for color change, pallor, rash and wound.   Allergic/Immunologic: Negative for food allergies and immunocompromised state.   Neurological: Negative for dizziness, tremors, seizures, syncope, facial asymmetry, speech difficulty, weakness, light-headedness, numbness and headaches.   Hematological: Negative for adenopathy. Does not bruise/bleed easily.   Psychiatric/Behavioral: Negative for agitation, behavioral problems, confusion, decreased concentration, dysphoric mood, hallucinations, self-injury, sleep disturbance and suicidal ideas. The patient is not nervous/anxious and is not hyperactive.        Objective:      Physical Exam   Constitutional: He is oriented to person, place, and time. He appears well-developed and well-nourished. No distress.   HENT:   Head: Normocephalic and atraumatic.   Eyes: Pupils are equal, round, and reactive to light.   Neck: Normal range of motion. Neck supple. No JVD present. Carotid bruit is not present. No tracheal deviation present. No thyromegaly present.   Cardiovascular: Normal rate, regular rhythm, normal heart sounds and intact distal pulses.   Pulmonary/Chest: Effort normal and breath sounds normal. No respiratory distress. He has no wheezes. He has no rales. He exhibits no tenderness.   Abdominal: Soft. Bowel sounds are normal. He exhibits no distension. There is no tenderness. There is no rebound and no guarding.   Musculoskeletal: Normal range of motion. He exhibits no edema or tenderness.   Lymphadenopathy:     He has no cervical adenopathy.   Neurological: He is alert and oriented to person, place, and time.   Skin: Skin is warm and dry. No rash noted. He is not diaphoretic. No erythema. No pallor.   Psychiatric: He has a normal mood and affect.  His behavior is normal. Judgment and thought content normal.   Nursing note and vitals reviewed.      CMP  Sodium   Date Value Ref Range Status   11/18/2019 141 136 - 145 mmol/L Final     Potassium   Date Value Ref Range Status   11/18/2019 4.5 3.5 - 5.1 mmol/L Final     Chloride   Date Value Ref Range Status   11/18/2019 106 95 - 110 mmol/L Final     CO2   Date Value Ref Range Status   11/18/2019 27 23 - 29 mmol/L Final     Glucose   Date Value Ref Range Status   11/18/2019 128 (H) 70 - 110 mg/dL Final     BUN, Bld   Date Value Ref Range Status   11/18/2019 13 8 - 23 mg/dL Final     Creatinine   Date Value Ref Range Status   11/18/2019 0.9 0.5 - 1.4 mg/dL Final     Calcium   Date Value Ref Range Status   11/18/2019 9.5 8.7 - 10.5 mg/dL Final     Total Protein   Date Value Ref Range Status   11/18/2019 7.2 6.0 - 8.4 g/dL Final     Albumin   Date Value Ref Range Status   11/18/2019 4.2 3.5 - 5.2 g/dL Final     Total Bilirubin   Date Value Ref Range Status   11/18/2019 1.0 0.1 - 1.0 mg/dL Final     Comment:     For infants and newborns, interpretation of results should be based  on gestational age, weight and in agreement with clinical  observations.  Premature Infant recommended reference ranges:  Up to 24 hours.............<8.0 mg/dL  Up to 48 hours............<12.0 mg/dL  3-5 days..................<15.0 mg/dL  6-29 days.................<15.0 mg/dL       Alkaline Phosphatase   Date Value Ref Range Status   11/18/2019 68 55 - 135 U/L Final     AST   Date Value Ref Range Status   11/18/2019 18 10 - 40 U/L Final     ALT   Date Value Ref Range Status   11/18/2019 23 10 - 44 U/L Final     Anion Gap   Date Value Ref Range Status   11/18/2019 8 8 - 16 mmol/L Final     eGFR if    Date Value Ref Range Status   11/18/2019 >60.0 >60 mL/min/1.73 m^2 Final     eGFR if non    Date Value Ref Range Status   11/18/2019 >60.0 >60 mL/min/1.73 m^2 Final     Comment:     Calculation used to obtain the  estimated glomerular filtration  rate (eGFR) is the CKD-EPI equation.        Lab Results   Component Value Date    WBC 8.04 11/18/2019    HGB 15.8 11/18/2019    HCT 49.2 11/18/2019    MCV 96 11/18/2019     11/18/2019     Lab Results   Component Value Date    CHOL 123 11/18/2019     Lab Results   Component Value Date    HDL 38 (L) 11/18/2019     Lab Results   Component Value Date    LDLCALC 56.2 (L) 11/18/2019     Lab Results   Component Value Date    TRIG 144 11/18/2019     Lab Results   Component Value Date    CHOLHDL 30.9 11/18/2019     Lab Results   Component Value Date    TSH 2.269 11/18/2019     Lab Results   Component Value Date    HGBA1C 6.5 (H) 11/18/2019     Assessment:       1. Essential hypertension    2. Hypercholesterolemia    3. History of colon cancer    4. Type 2 diabetes mellitus with hyperglycemia, without long-term current use of insulin    5. Noncompliance    6. Elevated CEA        Plan:   Essential hypertension  -     Comprehensive metabolic panel; Future; Expected date: 03/03/2020  -     CBC auto differential; Future; Expected date: 03/03/2020    Hypercholesterolemia    History of colon cancer    Type 2 diabetes mellitus with hyperglycemia, without long-term current use of insulin  -     Hemoglobin A1c; Future; Expected date: 03/03/2020    Noncompliance    Elevated CEA  -     CEA; Future; Expected date: 03/03/2020    Stable-------------------continue current meds.                He will call when ready to do colon- as recommended---------------f/u 6 months--------

## 2020-03-04 LAB
ESTIMATED AVG GLUCOSE: 154 MG/DL (ref 68–131)
HBA1C MFR BLD HPLC: 7 % (ref 4–5.6)

## 2020-05-24 RX ORDER — GABAPENTIN 300 MG/1
CAPSULE ORAL
Qty: 90 CAPSULE | Refills: 0 | Status: SHIPPED | OUTPATIENT
Start: 2020-05-24 | End: 2021-08-23

## 2020-08-07 ENCOUNTER — PATIENT OUTREACH (OUTPATIENT)
Dept: ADMINISTRATIVE | Facility: HOSPITAL | Age: 75
End: 2020-08-07

## 2020-08-07 NOTE — PROGRESS NOTES
I have contacted patient to schedule over due diabetic eye exam.  Patient stated that he will schedule appt [facility]

## 2020-10-05 ENCOUNTER — PATIENT OUTREACH (OUTPATIENT)
Dept: ADMINISTRATIVE | Facility: HOSPITAL | Age: 75
End: 2020-10-05

## 2020-10-05 NOTE — PROGRESS NOTES
Pt Scheduled for Physical Exam   10/29/2020       Viviana YUNG LPN Care Coordinator  Care Coordination Department  [facility]  [phone]

## 2020-10-28 ENCOUNTER — LAB VISIT (OUTPATIENT)
Dept: LAB | Facility: HOSPITAL | Age: 75
End: 2020-10-28
Attending: INTERNAL MEDICINE
Payer: COMMERCIAL

## 2020-10-28 ENCOUNTER — OFFICE VISIT (OUTPATIENT)
Dept: FAMILY MEDICINE | Facility: CLINIC | Age: 75
End: 2020-10-28
Payer: COMMERCIAL

## 2020-10-28 ENCOUNTER — TELEPHONE (OUTPATIENT)
Dept: ADMINISTRATIVE | Facility: HOSPITAL | Age: 75
End: 2020-10-28

## 2020-10-28 VITALS
DIASTOLIC BLOOD PRESSURE: 80 MMHG | OXYGEN SATURATION: 96 % | HEIGHT: 68 IN | HEART RATE: 80 BPM | WEIGHT: 196.19 LBS | TEMPERATURE: 99 F | SYSTOLIC BLOOD PRESSURE: 130 MMHG | BODY MASS INDEX: 29.73 KG/M2

## 2020-10-28 DIAGNOSIS — R97.0 ELEVATED CEA: ICD-10-CM

## 2020-10-28 DIAGNOSIS — E78.00 HYPERCHOLESTEROLEMIA: ICD-10-CM

## 2020-10-28 DIAGNOSIS — Z85.038 PERSONAL HISTORY OF COLON CANCER: ICD-10-CM

## 2020-10-28 DIAGNOSIS — Z12.5 ENCOUNTER FOR PROSTATE CANCER SCREENING: ICD-10-CM

## 2020-10-28 DIAGNOSIS — I10 ESSENTIAL HYPERTENSION: ICD-10-CM

## 2020-10-28 DIAGNOSIS — E11.65 TYPE 2 DIABETES MELLITUS WITH HYPERGLYCEMIA, WITHOUT LONG-TERM CURRENT USE OF INSULIN: Primary | ICD-10-CM

## 2020-10-28 DIAGNOSIS — E11.65 TYPE 2 DIABETES MELLITUS WITH HYPERGLYCEMIA, WITHOUT LONG-TERM CURRENT USE OF INSULIN: ICD-10-CM

## 2020-10-28 PROCEDURE — 3075F PR MOST RECENT SYSTOLIC BLOOD PRESS GE 130-139MM HG: ICD-10-PCS | Mod: CPTII,S$GLB,, | Performed by: INTERNAL MEDICINE

## 2020-10-28 PROCEDURE — 99999 PR PBB SHADOW E&M-EST. PATIENT-LVL III: ICD-10-PCS | Mod: PBBFAC,,, | Performed by: INTERNAL MEDICINE

## 2020-10-28 PROCEDURE — 36415 COLL VENOUS BLD VENIPUNCTURE: CPT | Mod: PO

## 2020-10-28 PROCEDURE — 1126F PR PAIN SEVERITY QUANTIFIED, NO PAIN PRESENT: ICD-10-PCS | Mod: S$GLB,,, | Performed by: INTERNAL MEDICINE

## 2020-10-28 PROCEDURE — 3075F SYST BP GE 130 - 139MM HG: CPT | Mod: CPTII,S$GLB,, | Performed by: INTERNAL MEDICINE

## 2020-10-28 PROCEDURE — 1126F AMNT PAIN NOTED NONE PRSNT: CPT | Mod: S$GLB,,, | Performed by: INTERNAL MEDICINE

## 2020-10-28 PROCEDURE — 84153 ASSAY OF PSA TOTAL: CPT

## 2020-10-28 PROCEDURE — 1101F PR PT FALLS ASSESS DOC 0-1 FALLS W/OUT INJ PAST YR: ICD-10-PCS | Mod: CPTII,S$GLB,, | Performed by: INTERNAL MEDICINE

## 2020-10-28 PROCEDURE — 3051F HG A1C>EQUAL 7.0%<8.0%: CPT | Mod: CPTII,S$GLB,, | Performed by: INTERNAL MEDICINE

## 2020-10-28 PROCEDURE — 86803 HEPATITIS C AB TEST: CPT

## 2020-10-28 PROCEDURE — 3079F PR MOST RECENT DIASTOLIC BLOOD PRESSURE 80-89 MM HG: ICD-10-PCS | Mod: CPTII,S$GLB,, | Performed by: INTERNAL MEDICINE

## 2020-10-28 PROCEDURE — 1101F PT FALLS ASSESS-DOCD LE1/YR: CPT | Mod: CPTII,S$GLB,, | Performed by: INTERNAL MEDICINE

## 2020-10-28 PROCEDURE — 99215 OFFICE O/P EST HI 40 MIN: CPT | Mod: S$GLB,,, | Performed by: INTERNAL MEDICINE

## 2020-10-28 PROCEDURE — 83036 HEMOGLOBIN GLYCOSYLATED A1C: CPT

## 2020-10-28 PROCEDURE — 99999 PR PBB SHADOW E&M-EST. PATIENT-LVL III: CPT | Mod: PBBFAC,,, | Performed by: INTERNAL MEDICINE

## 2020-10-28 PROCEDURE — 82378 CARCINOEMBRYONIC ANTIGEN: CPT

## 2020-10-28 PROCEDURE — 1159F PR MEDICATION LIST DOCUMENTED IN MEDICAL RECORD: ICD-10-PCS | Mod: S$GLB,,, | Performed by: INTERNAL MEDICINE

## 2020-10-28 PROCEDURE — 3079F DIAST BP 80-89 MM HG: CPT | Mod: CPTII,S$GLB,, | Performed by: INTERNAL MEDICINE

## 2020-10-28 PROCEDURE — 80053 COMPREHEN METABOLIC PANEL: CPT

## 2020-10-28 PROCEDURE — 99215 PR OFFICE/OUTPT VISIT, EST, LEVL V, 40-54 MIN: ICD-10-PCS | Mod: S$GLB,,, | Performed by: INTERNAL MEDICINE

## 2020-10-28 PROCEDURE — 1159F MED LIST DOCD IN RCRD: CPT | Mod: S$GLB,,, | Performed by: INTERNAL MEDICINE

## 2020-10-28 PROCEDURE — 85025 COMPLETE CBC W/AUTO DIFF WBC: CPT

## 2020-10-28 PROCEDURE — 80061 LIPID PANEL: CPT

## 2020-10-28 PROCEDURE — 3051F PR MOST RECENT HEMOGLOBIN A1C LEVEL 7.0 - < 8.0%: ICD-10-PCS | Mod: CPTII,S$GLB,, | Performed by: INTERNAL MEDICINE

## 2020-10-28 RX ORDER — SIMVASTATIN 20 MG/1
20 TABLET, FILM COATED ORAL DAILY
Qty: 90 TABLET | Refills: 3 | Status: SHIPPED | OUTPATIENT
Start: 2020-10-28 | End: 2021-11-19

## 2020-10-28 NOTE — PROGRESS NOTES
Subjective:       Patient ID: Marcelle Moore is a 75 y.o. male.    Chief Complaint: Follow-up, Hypertension, Hyperlipidemia, and Diabetes    Follow-up  Pertinent negatives include no abdominal pain, arthralgias, chest pain, chills, coughing, diaphoresis, fatigue, fever, headaches, joint swelling, myalgias, nausea, neck pain, numbness, rash, sore throat, vomiting or weakness.   Hypertension  This is a chronic problem. Pertinent negatives include no chest pain, headaches, neck pain, palpitations or shortness of breath.   Hyperlipidemia  This is a chronic problem. Pertinent negatives include no chest pain, myalgias or shortness of breath.   Diabetes  He presents for his follow-up diabetic visit. He has type 2 diabetes mellitus. Pertinent negatives for hypoglycemia include no confusion, dizziness, headaches, nervousness/anxiousness, pallor, seizures, speech difficulty or tremors. Pertinent negatives for diabetes include no chest pain, no fatigue, no polydipsia, no polyphagia, no polyuria and no weakness.     Past Medical History:   Diagnosis Date    Blood transfusion     Cataract     Colon cancer     Colon polyps     Hyperlipidemia     Hypertension      Past Surgical History:   Procedure Laterality Date    CATARACT EXTRACTION      COLECTOMY      COLONOSCOPY N/A 6/23/2016    Procedure: Colonoscopy;  Surgeon: Heath Steward MD;  Location: [facility];  Service: Endoscopy;  Laterality: N/A;    COLONOSCOPY N/A 6/15/2017    Procedure: COLONOSCOPY;  Surgeon: Sheila Sandoval MD;  Location: [facility];  Service: Endoscopy;  Laterality: N/A;    SHOULDER SURGERY       Family History   Problem Relation Age of Onset    Stroke Mother     Hypertension Mother     Diabetes Father     Breast cancer Sister     Colon cancer Paternal Grandfather      Social History     Socioeconomic History    Marital status:      Spouse name: Not on file    Number of children: Not on file    Years of education: Not on  file    Highest education level: Not on file   Occupational History    Not on file   Social Needs    Financial resource strain: Not on file    Food insecurity     Worry: Not on file     Inability: Not on file    Transportation needs     Medical: Not on file     Non-medical: Not on file   Tobacco Use    Smoking status: Former Smoker     Quit date: 1967     Years since quittin.2    Smokeless tobacco: Never Used    Tobacco comment: over 40 years   Substance and Sexual Activity    Alcohol use: No    Drug use: No    Sexual activity: Not on file   Lifestyle    Physical activity     Days per week: Not on file     Minutes per session: Not on file    Stress: Not on file   Relationships    Social connections     Talks on phone: Not on file     Gets together: Not on file     Attends Mandaen service: Not on file     Active member of club or organization: Not on file     Attends meetings of clubs or organizations: Not on file     Relationship status: Not on file   Other Topics Concern    Not on file   Social History Narrative    Not on file     Review of Systems   Constitutional: Negative for activity change, appetite change, chills, diaphoresis, fatigue, fever and unexpected weight change.   HENT: Negative for drooling, ear discharge, ear pain, facial swelling, hearing loss, mouth sores, nosebleeds, postnasal drip, rhinorrhea, sinus pressure, sneezing, sore throat, tinnitus, trouble swallowing and voice change.    Eyes: Negative for photophobia, redness and visual disturbance.   Respiratory: Negative for apnea, cough, choking, chest tightness, shortness of breath and wheezing.    Cardiovascular: Negative for chest pain, palpitations and leg swelling.   Gastrointestinal: Negative for abdominal distention, abdominal pain, anal bleeding, blood in stool, constipation, diarrhea, nausea and vomiting.   Endocrine: Negative for cold intolerance, heat intolerance, polydipsia, polyphagia and polyuria.    Genitourinary: Negative for difficulty urinating, dysuria, enuresis, flank pain, frequency, genital sores, hematuria and urgency.   Musculoskeletal: Negative for arthralgias, back pain, gait problem, joint swelling, myalgias, neck pain and neck stiffness.   Skin: Negative for color change, pallor, rash and wound.   Allergic/Immunologic: Negative for food allergies and immunocompromised state.   Neurological: Negative for dizziness, tremors, seizures, syncope, facial asymmetry, speech difficulty, weakness, light-headedness, numbness and headaches.   Hematological: Negative for adenopathy. Does not bruise/bleed easily.   Psychiatric/Behavioral: Negative for agitation, behavioral problems, confusion, decreased concentration, dysphoric mood, hallucinations, self-injury, sleep disturbance and suicidal ideas. The patient is not nervous/anxious and is not hyperactive.        Objective:      Physical Exam  Vitals signs and nursing note reviewed.   Constitutional:       General: He is not in acute distress.     Appearance: He is well-developed. He is not diaphoretic.   HENT:      Head: Normocephalic and atraumatic.      Mouth/Throat:      Pharynx: No oropharyngeal exudate.   Eyes:      General: No scleral icterus.     Pupils: Pupils are equal, round, and reactive to light.   Neck:      Musculoskeletal: Normal range of motion and neck supple.      Thyroid: No thyromegaly.      Vascular: No carotid bruit or JVD.      Trachea: No tracheal deviation.   Cardiovascular:      Rate and Rhythm: Normal rate and regular rhythm.      Heart sounds: Normal heart sounds.   Pulmonary:      Effort: Pulmonary effort is normal. No respiratory distress.      Breath sounds: Normal breath sounds. No wheezing or rales.   Chest:      Chest wall: No tenderness.   Abdominal:      General: Bowel sounds are normal. There is no distension.      Palpations: Abdomen is soft.      Tenderness: There is no abdominal tenderness. There is no guarding or  rebound.   Musculoskeletal: Normal range of motion.         General: No tenderness.   Lymphadenopathy:      Cervical: No cervical adenopathy.   Skin:     General: Skin is warm and dry.      Coloration: Skin is not pale.      Findings: No erythema or rash.   Neurological:      Mental Status: He is alert and oriented to person, place, and time.   Psychiatric:         Behavior: Behavior normal.         Thought Content: Thought content normal.         Judgment: Judgment normal.         CMP  Sodium   Date Value Ref Range Status   03/03/2020 141 136 - 145 mmol/L Final     Potassium   Date Value Ref Range Status   03/03/2020 4.5 3.5 - 5.1 mmol/L Final     Chloride   Date Value Ref Range Status   03/03/2020 106 95 - 110 mmol/L Final     CO2   Date Value Ref Range Status   03/03/2020 25 23 - 29 mmol/L Final     Glucose   Date Value Ref Range Status   03/03/2020 121 (H) 70 - 110 mg/dL Final     BUN   Date Value Ref Range Status   03/03/2020 16 8 - 23 mg/dL Final     Creatinine   Date Value Ref Range Status   03/03/2020 1.2 0.5 - 1.4 mg/dL Final     Calcium   Date Value Ref Range Status   03/03/2020 9.7 8.7 - 10.5 mg/dL Final     Total Protein   Date Value Ref Range Status   03/03/2020 7.1 6.0 - 8.4 g/dL Final     Albumin   Date Value Ref Range Status   03/03/2020 4.3 3.5 - 5.2 g/dL Final     Total Bilirubin   Date Value Ref Range Status   03/03/2020 0.8 0.1 - 1.0 mg/dL Final     Comment:     For infants and newborns, interpretation of results should be based  on gestational age, weight and in agreement with clinical  observations.  Premature Infant recommended reference ranges:  Up to 24 hours.............<8.0 mg/dL  Up to 48 hours............<12.0 mg/dL  3-5 days..................<15.0 mg/dL  6-29 days.................<15.0 mg/dL       Alkaline Phosphatase   Date Value Ref Range Status   03/03/2020 64 55 - 135 U/L Final     AST   Date Value Ref Range Status   03/03/2020 23 10 - 40 U/L Final     ALT   Date Value Ref Range  Status   03/03/2020 25 10 - 44 U/L Final     Anion Gap   Date Value Ref Range Status   03/03/2020 10 8 - 16 mmol/L Final     eGFR if    Date Value Ref Range Status   03/03/2020 >60.0 >60 mL/min/1.73 m^2 Final     eGFR if non    Date Value Ref Range Status   03/03/2020 58.8 (A) >60 mL/min/1.73 m^2 Final     Comment:     Calculation used to obtain the estimated glomerular filtration  rate (eGFR) is the CKD-EPI equation.        Lab Results   Component Value Date    WBC 9.49 03/03/2020    HGB 15.2 03/03/2020    HCT 48.1 03/03/2020    MCV 98 03/03/2020     03/03/2020     Lab Results   Component Value Date    CHOL 123 11/18/2019     Lab Results   Component Value Date    HDL 38 (L) 11/18/2019     Lab Results   Component Value Date    LDLCALC 56.2 (L) 11/18/2019     Lab Results   Component Value Date    TRIG 144 11/18/2019     Lab Results   Component Value Date    CHOLHDL 30.9 11/18/2019     Lab Results   Component Value Date    TSH 2.269 11/18/2019     Lab Results   Component Value Date    HGBA1C 7.0 (H) 03/03/2020     Assessment:       1. Type 2 diabetes mellitus with hyperglycemia, without long-term current use of insulin    2. Personal history of colon cancer    3. Hypercholesterolemia    4. Essential hypertension    5. Elevated CEA    6. Encounter for prostate cancer screening        Plan:   Type 2 diabetes mellitus with hyperglycemia, without long-term current use of insulin  -     Hemoglobin A1C; Future; Expected date: 10/28/2020  -     Hepatitis C Antibody; Future; Expected date: 10/28/2020    Personal history of colon cancer  -     Case request GI: COLONOSCOPY    Hypercholesterolemia  -     Lipid Panel; Future; Expected date: 10/28/2020    Essential hypertension  -     Comprehensive Metabolic Panel; Future; Expected date: 10/28/2020  -     CBC auto differential; Future; Expected date: 10/28/2020    Elevated CEA  -     CEA; Future; Expected date: 10/28/2020  -     Case request  GI: COLONOSCOPY    Encounter for prostate cancer screening  -     PSA, Screening; Future; Expected date: 10/28/2020    Other orders  -     simvastatin (ZOCOR) 20 MG tablet; Take 1 tablet (20 mg total) by mouth once daily.  Dispense: 90 tablet; Refill: 3    Stable----------continue meds-----------as above-------------------f/u 6 months-----------

## 2020-10-29 ENCOUNTER — TELEPHONE (OUTPATIENT)
Dept: FAMILY MEDICINE | Facility: CLINIC | Age: 75
End: 2020-10-29

## 2020-10-29 DIAGNOSIS — C18.9 MALIGNANT NEOPLASM OF COLON, UNSPECIFIED PART OF COLON: Primary | ICD-10-CM

## 2020-10-29 LAB
ALBUMIN SERPL BCP-MCNC: 4.2 G/DL (ref 3.5–5.2)
ALP SERPL-CCNC: 71 U/L (ref 55–135)
ALT SERPL W/O P-5'-P-CCNC: 24 U/L (ref 10–44)
ANION GAP SERPL CALC-SCNC: 11 MMOL/L (ref 8–16)
AST SERPL-CCNC: 20 U/L (ref 10–40)
BASOPHILS # BLD AUTO: 0.08 K/UL (ref 0–0.2)
BASOPHILS NFR BLD: 1.1 % (ref 0–1.9)
BILIRUB SERPL-MCNC: 0.8 MG/DL (ref 0.1–1)
BUN SERPL-MCNC: 18 MG/DL (ref 8–23)
CALCIUM SERPL-MCNC: 9.3 MG/DL (ref 8.7–10.5)
CEA SERPL-MCNC: 5.6 NG/ML (ref 0–5)
CHLORIDE SERPL-SCNC: 105 MMOL/L (ref 95–110)
CHOLEST SERPL-MCNC: 114 MG/DL (ref 120–199)
CHOLEST/HDLC SERPL: 3.3 {RATIO} (ref 2–5)
CO2 SERPL-SCNC: 24 MMOL/L (ref 23–29)
COMPLEXED PSA SERPL-MCNC: 0.37 NG/ML (ref 0–4)
CREAT SERPL-MCNC: 1.1 MG/DL (ref 0.5–1.4)
DIFFERENTIAL METHOD: ABNORMAL
EOSINOPHIL # BLD AUTO: 0.3 K/UL (ref 0–0.5)
EOSINOPHIL NFR BLD: 3.7 % (ref 0–8)
ERYTHROCYTE [DISTWIDTH] IN BLOOD BY AUTOMATED COUNT: 14.4 % (ref 11.5–14.5)
EST. GFR  (AFRICAN AMERICAN): >60 ML/MIN/1.73 M^2
EST. GFR  (NON AFRICAN AMERICAN): >60 ML/MIN/1.73 M^2
ESTIMATED AVG GLUCOSE: 137 MG/DL (ref 68–131)
GLUCOSE SERPL-MCNC: 120 MG/DL (ref 70–110)
HBA1C MFR BLD HPLC: 6.4 % (ref 4–5.6)
HCT VFR BLD AUTO: 49.8 % (ref 40–54)
HCV AB SERPL QL IA: NEGATIVE
HDLC SERPL-MCNC: 35 MG/DL (ref 40–75)
HDLC SERPL: 30.7 % (ref 20–50)
HGB BLD-MCNC: 16 G/DL (ref 14–18)
IMM GRANULOCYTES # BLD AUTO: 0.03 K/UL (ref 0–0.04)
IMM GRANULOCYTES NFR BLD AUTO: 0.4 % (ref 0–0.5)
LDLC SERPL CALC-MCNC: 60.4 MG/DL (ref 63–159)
LYMPHOCYTES # BLD AUTO: 1.7 K/UL (ref 1–4.8)
LYMPHOCYTES NFR BLD: 23.8 % (ref 18–48)
MCH RBC QN AUTO: 30.9 PG (ref 27–31)
MCHC RBC AUTO-ENTMCNC: 32.1 G/DL (ref 32–36)
MCV RBC AUTO: 96 FL (ref 82–98)
MONOCYTES # BLD AUTO: 0.7 K/UL (ref 0.3–1)
MONOCYTES NFR BLD: 10.3 % (ref 4–15)
NEUTROPHILS # BLD AUTO: 4.3 K/UL (ref 1.8–7.7)
NEUTROPHILS NFR BLD: 60.7 % (ref 38–73)
NONHDLC SERPL-MCNC: 79 MG/DL
NRBC BLD-RTO: 0 /100 WBC
PLATELET # BLD AUTO: 83 K/UL (ref 150–350)
PMV BLD AUTO: 11.8 FL (ref 9.2–12.9)
POTASSIUM SERPL-SCNC: 4.3 MMOL/L (ref 3.5–5.1)
PROT SERPL-MCNC: 7.1 G/DL (ref 6–8.4)
RBC # BLD AUTO: 5.18 M/UL (ref 4.6–6.2)
SODIUM SERPL-SCNC: 140 MMOL/L (ref 136–145)
TRIGL SERPL-MCNC: 93 MG/DL (ref 30–150)
WBC # BLD AUTO: 7.09 K/UL (ref 3.9–12.7)

## 2020-10-29 NOTE — TELEPHONE ENCOUNTER
Gi consult for f/u colon cancer and repeat cbc in 2 weeks for low platelets----------other  Labs good-

## 2020-12-29 ENCOUNTER — TELEPHONE (OUTPATIENT)
Dept: GASTROENTEROLOGY | Facility: CLINIC | Age: 75
End: 2020-12-29

## 2020-12-29 NOTE — TELEPHONE ENCOUNTER
Spoke with patient and he stated that he wanted to hold off on scheduling an appointment at this time. He stated that he would call back to schedule when ready.

## 2020-12-29 NOTE — TELEPHONE ENCOUNTER
----- Message from Katiuska Brown RN sent at 12/22/2020  2:30 PM CST -----  Received a referral for this patient  He is technically established with [facility] - last OV 11/2019  Will you please contact to schedule f/u    TR

## 2021-04-16 ENCOUNTER — TELEPHONE (OUTPATIENT)
Dept: ENDOSCOPY | Facility: HOSPITAL | Age: 76
End: 2021-04-16

## 2021-04-28 ENCOUNTER — PATIENT MESSAGE (OUTPATIENT)
Dept: RESEARCH | Facility: HOSPITAL | Age: 76
End: 2021-04-28

## 2021-08-04 ENCOUNTER — PATIENT MESSAGE (OUTPATIENT)
Dept: ADMINISTRATIVE | Facility: HOSPITAL | Age: 76
End: 2021-08-04

## 2021-10-18 ENCOUNTER — PATIENT MESSAGE (OUTPATIENT)
Dept: ADMINISTRATIVE | Facility: HOSPITAL | Age: 76
End: 2021-10-18

## 2021-12-10 ENCOUNTER — PATIENT OUTREACH (OUTPATIENT)
Dept: ADMINISTRATIVE | Facility: HOSPITAL | Age: 76
End: 2021-12-10

## 2021-12-21 ENCOUNTER — PATIENT MESSAGE (OUTPATIENT)
Dept: ADMINISTRATIVE | Facility: HOSPITAL | Age: 76
End: 2021-12-21

## 2021-12-21 ENCOUNTER — PATIENT OUTREACH (OUTPATIENT)
Dept: ADMINISTRATIVE | Facility: HOSPITAL | Age: 76
End: 2021-12-21

## 2022-01-13 DIAGNOSIS — I10 ESSENTIAL HYPERTENSION: ICD-10-CM

## 2022-01-13 RX ORDER — AMLODIPINE BESYLATE 5 MG/1
TABLET ORAL
Qty: 180 TABLET | Refills: 3 | Status: SHIPPED | OUTPATIENT
Start: 2022-01-13

## 2022-01-13 RX ORDER — LOSARTAN POTASSIUM 100 MG/1
TABLET ORAL
Qty: 90 TABLET | Refills: 3 | Status: SHIPPED | OUTPATIENT
Start: 2022-01-13

## 2022-01-13 NOTE — TELEPHONE ENCOUNTER
Care Due:                  Date            Visit Type   Department     Provider  --------------------------------------------------------------------------------                                             [facility]  Last Visit: 10-      None         MEDICINE       [place]  Next Visit: None Scheduled  None         None Found                                                            Last  Test          Frequency    Reason                     Performed    Due Date  --------------------------------------------------------------------------------    Office Visit  12 months..  losartan, simvastatin....  10-   10-    [facility].........  12 months..  losartan, simvastatin....  Not Found    Overdue    Lipid Panel.  12 months..  simvastatin..............  Not Found    Overdue    Powered by [employer] by [employer]. Reference number: [id number].   1/13/2022 12:23:40 AM CST

## 2022-02-02 DIAGNOSIS — E11.65 TYPE 2 DIABETES MELLITUS WITH HYPERGLYCEMIA, WITHOUT LONG-TERM CURRENT USE OF INSULIN: ICD-10-CM
